# Patient Record
Sex: FEMALE | Race: WHITE | HISPANIC OR LATINO | URBAN - METROPOLITAN AREA
[De-identification: names, ages, dates, MRNs, and addresses within clinical notes are randomized per-mention and may not be internally consistent; named-entity substitution may affect disease eponyms.]

---

## 2018-12-12 ENCOUNTER — EMERGENCY (EMERGENCY)
Facility: HOSPITAL | Age: 28
LOS: 1 days | End: 2018-12-12

## 2018-12-12 VITALS
TEMPERATURE: 98 F | HEART RATE: 99 BPM | OXYGEN SATURATION: 100 % | DIASTOLIC BLOOD PRESSURE: 81 MMHG | RESPIRATION RATE: 20 BRPM | SYSTOLIC BLOOD PRESSURE: 111 MMHG
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
## 2021-08-30 DIAGNOSIS — Z01.818 ENCOUNTER FOR OTHER PREPROCEDURAL EXAMINATION: ICD-10-CM

## 2021-08-30 PROBLEM — Z00.00 ENCOUNTER FOR PREVENTIVE HEALTH EXAMINATION: Noted: 2021-08-30

## 2021-08-31 ENCOUNTER — APPOINTMENT (OUTPATIENT)
Dept: DISASTER EMERGENCY | Facility: CLINIC | Age: 31
End: 2021-08-31

## 2021-09-01 LAB — SARS-COV-2 N GENE NPH QL NAA+PROBE: NOT DETECTED

## 2021-09-03 ENCOUNTER — OUTPATIENT (OUTPATIENT)
Dept: OUTPATIENT SERVICES | Facility: HOSPITAL | Age: 31
LOS: 1 days | Discharge: HOME | End: 2021-09-03
Payer: MEDICAID

## 2021-09-03 ENCOUNTER — RESULT REVIEW (OUTPATIENT)
Age: 31
End: 2021-09-03

## 2021-09-03 VITALS
WEIGHT: 250 LBS | SYSTOLIC BLOOD PRESSURE: 123 MMHG | DIASTOLIC BLOOD PRESSURE: 64 MMHG | HEART RATE: 73 BPM | HEIGHT: 64 IN | TEMPERATURE: 98 F | RESPIRATION RATE: 17 BRPM | OXYGEN SATURATION: 100 %

## 2021-09-03 DIAGNOSIS — K21.9 GASTRO-ESOPHAGEAL REFLUX DISEASE WITHOUT ESOPHAGITIS: ICD-10-CM

## 2021-09-03 DIAGNOSIS — Z98.84 BARIATRIC SURGERY STATUS: Chronic | ICD-10-CM

## 2021-09-03 DIAGNOSIS — Z98.891 HISTORY OF UTERINE SCAR FROM PREVIOUS SURGERY: Chronic | ICD-10-CM

## 2021-09-03 DIAGNOSIS — E66.01 MORBID (SEVERE) OBESITY DUE TO EXCESS CALORIES: ICD-10-CM

## 2021-09-03 LAB — HCG UR QL: NEGATIVE — SIGNIFICANT CHANGE UP

## 2021-09-03 PROCEDURE — 81025 URINE PREGNANCY TEST: CPT

## 2021-09-03 PROCEDURE — 88312 SPECIAL STAINS GROUP 1: CPT

## 2021-09-03 PROCEDURE — 88305 TISSUE EXAM BY PATHOLOGIST: CPT

## 2021-09-03 PROCEDURE — 88312 SPECIAL STAINS GROUP 1: CPT | Mod: 26

## 2021-09-03 PROCEDURE — 88313 SPECIAL STAINS GROUP 2: CPT

## 2021-09-03 PROCEDURE — 88313 SPECIAL STAINS GROUP 2: CPT | Mod: 26

## 2021-09-03 PROCEDURE — 88305 TISSUE EXAM BY PATHOLOGIST: CPT | Mod: 26

## 2021-09-08 DIAGNOSIS — E66.01 MORBID (SEVERE) OBESITY DUE TO EXCESS CALORIES: ICD-10-CM

## 2021-09-08 DIAGNOSIS — K22.70 BARRETT'S ESOPHAGUS WITHOUT DYSPLASIA: ICD-10-CM

## 2021-09-08 DIAGNOSIS — Z98.84 BARIATRIC SURGERY STATUS: ICD-10-CM

## 2021-09-08 DIAGNOSIS — K29.50 UNSPECIFIED CHRONIC GASTRITIS WITHOUT BLEEDING: ICD-10-CM

## 2021-09-08 DIAGNOSIS — K21.9 GASTRO-ESOPHAGEAL REFLUX DISEASE WITHOUT ESOPHAGITIS: ICD-10-CM

## 2022-02-01 ENCOUNTER — OUTPATIENT (OUTPATIENT)
Dept: OUTPATIENT SERVICES | Facility: HOSPITAL | Age: 32
LOS: 1 days | End: 2022-02-01
Payer: MEDICAID

## 2022-02-01 VITALS
TEMPERATURE: 98 F | WEIGHT: 238.1 LBS | RESPIRATION RATE: 16 BRPM | SYSTOLIC BLOOD PRESSURE: 107 MMHG | HEIGHT: 64 IN | HEART RATE: 68 BPM | DIASTOLIC BLOOD PRESSURE: 82 MMHG | OXYGEN SATURATION: 100 %

## 2022-02-01 DIAGNOSIS — Z98.891 HISTORY OF UTERINE SCAR FROM PREVIOUS SURGERY: Chronic | ICD-10-CM

## 2022-02-01 DIAGNOSIS — Z29.9 ENCOUNTER FOR PROPHYLACTIC MEASURES, UNSPECIFIED: ICD-10-CM

## 2022-02-01 DIAGNOSIS — Z01.818 ENCOUNTER FOR OTHER PREPROCEDURAL EXAMINATION: ICD-10-CM

## 2022-02-01 DIAGNOSIS — Z98.890 OTHER SPECIFIED POSTPROCEDURAL STATES: Chronic | ICD-10-CM

## 2022-02-01 DIAGNOSIS — R13.10 DYSPHAGIA, UNSPECIFIED: ICD-10-CM

## 2022-02-01 DIAGNOSIS — Z98.84 BARIATRIC SURGERY STATUS: Chronic | ICD-10-CM

## 2022-02-01 LAB
ALBUMIN SERPL ELPH-MCNC: 3.3 G/DL — SIGNIFICANT CHANGE UP (ref 3.3–5)
ANION GAP SERPL CALC-SCNC: 5 MMOL/L — SIGNIFICANT CHANGE UP (ref 5–17)
APPEARANCE UR: CLEAR — SIGNIFICANT CHANGE UP
APTT BLD: 29.8 SEC — SIGNIFICANT CHANGE UP (ref 27.5–35.5)
BASOPHILS # BLD AUTO: 0.03 K/UL — SIGNIFICANT CHANGE UP (ref 0–0.2)
BASOPHILS NFR BLD AUTO: 0.5 % — SIGNIFICANT CHANGE UP (ref 0–2)
BILIRUB UR-MCNC: NEGATIVE — SIGNIFICANT CHANGE UP
BUN SERPL-MCNC: 9 MG/DL — SIGNIFICANT CHANGE UP (ref 7–23)
CALCIUM SERPL-MCNC: 9 MG/DL — SIGNIFICANT CHANGE UP (ref 8.5–10.1)
CHLORIDE SERPL-SCNC: 104 MMOL/L — SIGNIFICANT CHANGE UP (ref 96–108)
CO2 SERPL-SCNC: 26 MMOL/L — SIGNIFICANT CHANGE UP (ref 22–31)
COHGB MFR BLDV: 2.3 % — SIGNIFICANT CHANGE UP
COLOR SPEC: YELLOW — SIGNIFICANT CHANGE UP
CREAT SERPL-MCNC: 0.83 MG/DL — SIGNIFICANT CHANGE UP (ref 0.5–1.3)
DIFF PNL FLD: ABNORMAL
EOSINOPHIL # BLD AUTO: 0.16 K/UL — SIGNIFICANT CHANGE UP (ref 0–0.5)
EOSINOPHIL NFR BLD AUTO: 2.5 % — SIGNIFICANT CHANGE UP (ref 0–6)
GLUCOSE SERPL-MCNC: 96 MG/DL — SIGNIFICANT CHANGE UP (ref 70–99)
GLUCOSE UR QL: NEGATIVE — SIGNIFICANT CHANGE UP
HCT VFR BLD CALC: 37.2 % — SIGNIFICANT CHANGE UP (ref 34.5–45)
HGB BLD-MCNC: 11.8 G/DL — SIGNIFICANT CHANGE UP (ref 11.5–15.5)
IMM GRANULOCYTES NFR BLD AUTO: 0.5 % — SIGNIFICANT CHANGE UP (ref 0–1.5)
INR BLD: 1.02 RATIO — SIGNIFICANT CHANGE UP (ref 0.88–1.16)
KETONES UR-MCNC: NEGATIVE — SIGNIFICANT CHANGE UP
LEUKOCYTE ESTERASE UR-ACNC: NEGATIVE — SIGNIFICANT CHANGE UP
LYMPHOCYTES # BLD AUTO: 1.31 K/UL — SIGNIFICANT CHANGE UP (ref 1–3.3)
LYMPHOCYTES # BLD AUTO: 20.7 % — SIGNIFICANT CHANGE UP (ref 13–44)
MCHC RBC-ENTMCNC: 27.3 PG — SIGNIFICANT CHANGE UP (ref 27–34)
MCHC RBC-ENTMCNC: 31.7 GM/DL — LOW (ref 32–36)
MCV RBC AUTO: 85.9 FL — SIGNIFICANT CHANGE UP (ref 80–100)
MONOCYTES # BLD AUTO: 0.45 K/UL — SIGNIFICANT CHANGE UP (ref 0–0.9)
MONOCYTES NFR BLD AUTO: 7.1 % — SIGNIFICANT CHANGE UP (ref 2–14)
NEUTROPHILS # BLD AUTO: 4.36 K/UL — SIGNIFICANT CHANGE UP (ref 1.8–7.4)
NEUTROPHILS NFR BLD AUTO: 68.7 % — SIGNIFICANT CHANGE UP (ref 43–77)
NITRITE UR-MCNC: NEGATIVE — SIGNIFICANT CHANGE UP
PH UR: 6.5 — SIGNIFICANT CHANGE UP (ref 5–8)
PLATELET # BLD AUTO: 246 K/UL — SIGNIFICANT CHANGE UP (ref 150–400)
POTASSIUM SERPL-MCNC: 3.7 MMOL/L — SIGNIFICANT CHANGE UP (ref 3.5–5.3)
POTASSIUM SERPL-SCNC: 3.7 MMOL/L — SIGNIFICANT CHANGE UP (ref 3.5–5.3)
PROT UR-MCNC: SIGNIFICANT CHANGE UP
PROTHROM AB SERPL-ACNC: 11.8 SEC — SIGNIFICANT CHANGE UP (ref 10.6–13.6)
RBC # BLD: 4.33 M/UL — SIGNIFICANT CHANGE UP (ref 3.8–5.2)
RBC # FLD: 14.6 % — HIGH (ref 10.3–14.5)
SODIUM SERPL-SCNC: 135 MMOL/L — SIGNIFICANT CHANGE UP (ref 135–145)
SP GR SPEC: 1.01 — SIGNIFICANT CHANGE UP (ref 1.01–1.02)
UROBILINOGEN FLD QL: NEGATIVE — SIGNIFICANT CHANGE UP
WBC # BLD: 6.34 K/UL — SIGNIFICANT CHANGE UP (ref 3.8–10.5)
WBC # FLD AUTO: 6.34 K/UL — SIGNIFICANT CHANGE UP (ref 3.8–10.5)

## 2022-02-01 PROCEDURE — G0463: CPT | Mod: 25

## 2022-02-01 PROCEDURE — 93005 ELECTROCARDIOGRAM TRACING: CPT

## 2022-02-01 PROCEDURE — 81001 URINALYSIS AUTO W/SCOPE: CPT

## 2022-02-01 PROCEDURE — 36415 COLL VENOUS BLD VENIPUNCTURE: CPT

## 2022-02-01 PROCEDURE — 82375 ASSAY CARBOXYHB QUANT: CPT

## 2022-02-01 PROCEDURE — 93010 ELECTROCARDIOGRAM REPORT: CPT

## 2022-02-01 PROCEDURE — 85730 THROMBOPLASTIN TIME PARTIAL: CPT

## 2022-02-01 PROCEDURE — 86900 BLOOD TYPING SEROLOGIC ABO: CPT

## 2022-02-01 PROCEDURE — 85610 PROTHROMBIN TIME: CPT

## 2022-02-01 PROCEDURE — 80048 BASIC METABOLIC PNL TOTAL CA: CPT

## 2022-02-01 PROCEDURE — 82040 ASSAY OF SERUM ALBUMIN: CPT

## 2022-02-01 PROCEDURE — 86850 RBC ANTIBODY SCREEN: CPT

## 2022-02-01 PROCEDURE — 85025 COMPLETE CBC W/AUTO DIFF WBC: CPT

## 2022-02-01 PROCEDURE — 86901 BLOOD TYPING SEROLOGIC RH(D): CPT

## 2022-02-01 PROCEDURE — 71046 X-RAY EXAM CHEST 2 VIEWS: CPT

## 2022-02-01 PROCEDURE — 71046 X-RAY EXAM CHEST 2 VIEWS: CPT | Mod: 26

## 2022-02-01 NOTE — H&P PST ADULT - NSANTHOSAYNRD_GEN_A_CORE
No. CALI screening performed.  STOP BANG Legend: 0-2 = LOW Risk; 3-4 = INTERMEDIATE Risk; 5-8 = HIGH Risk

## 2022-02-01 NOTE — H&P PST ADULT - ASSESSMENT
31 y.o female scheduled for  31 y.o female scheduled for  Laparoscopic Gastric Bypass, Intraoperative Endoscopy   Plan  1. Stop all NSAIDS, herbal supplements and vitamins for 7 days.  2. NPO at midnight.  3. Take the following medications--none-- with small sips of water on the morning of your procedure/surgery.  4. Use EZ sponges as directed  5. Labs, EKG, CXR  as per surgeon  6. PMD YO [facility] & Dr Vega cardiology  visit for optimization prior to surgery as per surgeon  7. UCG STAT on admit  8. COVID swab appt: 2022    CAPRINI SCORE    AGE RELATED RISK FACTORS                                                       MOBILITY RELATED FACTORS  [ ] Age 41-60 years                                            (1 Point)                  [ ] Bed rest                                                        (1 Point)  [ ] Age: 61-74 years                                           (2 Points)                [ ] Plaster cast                                                   (2 Points)  [ ] Age= 75 years                                              (3 Points)                 [ ] Bed bound for more than 72 hours                   (2 Points)    DISEASE RELATED RISK FACTORS                                               GENDER SPECIFIC FACTORS  [ ] Edema in the lower extremities                       (1 Point)                  [ ] Pregnancy                                                     (1 Point)  [ ] Varicose veins                                               (1 Point)                  [ ] Post-partum < 6 weeks                                   (1 Point)             [x ] BMI > 25 Kg/m2                                            (1 Point)                  [ ] Hormonal therapy  or oral contraception            (1 Point)                 [ ] Sepsis (in the previous month)                        (1 Point)                  [ ] History of pregnancy complications  [ ] Pneumonia or serious lung disease                                               [ ] Unexplained or recurrent                       (1 Point)           (in the previous month)                               (1 Point)  [ ] Abnormal pulmonary function test                     (1 Point)                 SURGERY RELATED RISK FACTORS  [ ] Acute myocardial infarction                              (1 Point)                 [ ]  Section                                            (1 Point)  [ ] Congestive heart failure (in the previous month)  (1 Point)                 [ ] Minor surgery                                                 (1 Point)   [ ] Inflammatory bowel disease                             (1 Point)                 [ ] Arthroscopic surgery                                        (2 Points)  [ ] Central venous access                                    (2 Points)                [x ] General surgery lasting more than 45 minutes   (2 Points)       [ ] Stroke (in the previous month)                          (5 Points)               [ ] Elective arthroplasty                                        (5 Points)                                                                                                                                               HEMATOLOGY RELATED FACTORS                                                 TRAUMA RELATED RISK FACTORS  [ ] Prior episodes of VTE                                     (3 Points)                 [ ] Fracture of the hip, pelvis, or leg                       (5 Points)  [ ] Positive family history for VTE                         (3 Points)                 [ ] Acute spinal cord injury (in the previous month)  (5 Points)  [ ] Prothrombin 17435 A                                      (3 Points)                 [ ] Paralysis  (less than 1 month)                          (5 Points)  [ ] Factor V Leiden                                             (3 Points)                 [ ] Multiple Trauma within 1 month                         (5 Points)  [ ] Lupus anticoagulants                                     (3 Points)                                                           [ ] Anticardiolipin antibodies                                (3 Points)                                                       [ ] High homocysteine in the blood                      (3 Points)                                             [ ] Other congenital or acquired thrombophilia       (3 Points)                                                [ ] Heparin induced thrombocytopenia                  (3 Points)                                          Total Score [    3      ]    The Caprini score indicates this patient is at risk for a VTE event (score 3-5).  Most surgical patients in this group would benefit from pharmacologic prophylaxis.  The surgical team will determine the balance between VTE risk and bleeding risk

## 2022-02-01 NOTE — H&P PST ADULT - NSICDXPASTMEDICALHX_GEN_ALL_CORE_FT
PAST MEDICAL HISTORY:  Dysphagia     GERD (gastroesophageal reflux disease)     Obesity, morbid, BMI 40.0-49.9

## 2022-02-01 NOTE — H&P PST ADULT - HEIGHT IN INCHES
The patient is awake, alert and cooperative with a calm affect, patient is aware of environment, family at bedside. Airway is open and patent, respirations are spontaneous, normal respiratory effort and rate noted, skin warm and dry, full ROM in all extremities, appearance: no apparent distress noted, resting comfortably.  VSS, no change from previous assessment.  Bed in low, locked position, HOB 30 degrees.  Pt able to change position independently.  Call bell within reach of pt.  Pt verbalizes understanding of use.  Will continue to monitor.  
4

## 2022-02-01 NOTE — H&P PST ADULT - NSICDXPASTSURGICALHX_GEN_ALL_CORE_FT
PAST SURGICAL HISTORY:  H/O bariatric surgery Gastric Sleeve  2013    H/O  section TWINS  2015    History of esophagogastroduodenoscopy (EGD) 2021

## 2022-02-01 NOTE — H&P PST ADULT - HISTORY OF PRESENT ILLNESS
31 y.o WD, WN morbidly obese female presents to PST with hx of severe acid reflux. Patient reports hx of having a gastric sleeve in  and lost 220 lbs. She did well until she had twins via  in . Patient states she gained back about a 100 lbs after that. She began to have severe acid reflux and difficulty tolerating meals. Despite medication her symptoms continued and worsened. Patient followed with bariatric surgeon and opting for surgical intervention. Scheduled for  31 y.o WD, WN morbidly obese female presents to PST with hx of severe acid reflux. Patient reports hx of having a gastric sleeve in  and lost 220 lbs. She did well until she had twins via  in . Patient states she gained back about a 100 lbs after that. She began to have severe acid reflux and difficulty tolerating meals. Despite medication her symptoms continued and worsened. Patient followed with bariatric surgeon and opting for surgical intervention. Scheduled for Laparoscopic Gastric Bypass, Intraoperative Endoscopy

## 2022-02-02 DIAGNOSIS — R13.10 DYSPHAGIA, UNSPECIFIED: ICD-10-CM

## 2022-02-02 DIAGNOSIS — Z01.818 ENCOUNTER FOR OTHER PREPROCEDURAL EXAMINATION: ICD-10-CM

## 2022-02-06 ENCOUNTER — TRANSCRIPTION ENCOUNTER (OUTPATIENT)
Age: 32
End: 2022-02-06

## 2022-02-08 RX ORDER — SODIUM CHLORIDE 9 MG/ML
3 INJECTION INTRAMUSCULAR; INTRAVENOUS; SUBCUTANEOUS EVERY 8 HOURS
Refills: 0 | Status: DISCONTINUED | OUTPATIENT
Start: 2022-02-09 | End: 2022-02-10

## 2022-02-09 ENCOUNTER — INPATIENT (INPATIENT)
Facility: HOSPITAL | Age: 32
LOS: 0 days | Discharge: HOME | DRG: 403 | End: 2022-02-10
Attending: SURGERY | Admitting: SURGERY
Payer: MEDICAID

## 2022-02-09 VITALS
SYSTOLIC BLOOD PRESSURE: 123 MMHG | TEMPERATURE: 97 F | HEIGHT: 64 IN | OXYGEN SATURATION: 100 % | RESPIRATION RATE: 16 BRPM | WEIGHT: 238.1 LBS | HEART RATE: 76 BPM | DIASTOLIC BLOOD PRESSURE: 76 MMHG

## 2022-02-09 DIAGNOSIS — R13.10 DYSPHAGIA, UNSPECIFIED: ICD-10-CM

## 2022-02-09 DIAGNOSIS — Z98.890 OTHER SPECIFIED POSTPROCEDURAL STATES: Chronic | ICD-10-CM

## 2022-02-09 DIAGNOSIS — K21.9 GASTRO-ESOPHAGEAL REFLUX DISEASE WITHOUT ESOPHAGITIS: ICD-10-CM

## 2022-02-09 DIAGNOSIS — Z98.84 BARIATRIC SURGERY STATUS: Chronic | ICD-10-CM

## 2022-02-09 DIAGNOSIS — Z98.891 HISTORY OF UTERINE SCAR FROM PREVIOUS SURGERY: Chronic | ICD-10-CM

## 2022-02-09 LAB — HCG UR QL: NEGATIVE — SIGNIFICANT CHANGE UP

## 2022-02-09 PROCEDURE — C9113: CPT

## 2022-02-09 PROCEDURE — 36415 COLL VENOUS BLD VENIPUNCTURE: CPT

## 2022-02-09 PROCEDURE — C1889: CPT

## 2022-02-09 PROCEDURE — 85025 COMPLETE CBC W/AUTO DIFF WBC: CPT

## 2022-02-09 PROCEDURE — 80048 BASIC METABOLIC PNL TOTAL CA: CPT

## 2022-02-09 PROCEDURE — 81025 URINE PREGNANCY TEST: CPT

## 2022-02-09 PROCEDURE — 82962 GLUCOSE BLOOD TEST: CPT

## 2022-02-09 RX ORDER — ONDANSETRON 8 MG/1
4 TABLET, FILM COATED ORAL ONCE
Refills: 0 | Status: DISCONTINUED | OUTPATIENT
Start: 2022-02-09 | End: 2022-02-09

## 2022-02-09 RX ORDER — APREPITANT 80 MG/1
80 CAPSULE ORAL ONCE
Refills: 0 | Status: COMPLETED | OUTPATIENT
Start: 2022-02-09 | End: 2022-02-09

## 2022-02-09 RX ORDER — ONDANSETRON 8 MG/1
4 TABLET, FILM COATED ORAL EVERY 6 HOURS
Refills: 0 | Status: DISCONTINUED | OUTPATIENT
Start: 2022-02-09 | End: 2022-02-10

## 2022-02-09 RX ORDER — OXYCODONE HYDROCHLORIDE 5 MG/1
10 TABLET ORAL EVERY 4 HOURS
Refills: 0 | Status: DISCONTINUED | OUTPATIENT
Start: 2022-02-09 | End: 2022-02-10

## 2022-02-09 RX ORDER — ENOXAPARIN SODIUM 100 MG/ML
40 INJECTION SUBCUTANEOUS EVERY 12 HOURS
Refills: 0 | Status: DISCONTINUED | OUTPATIENT
Start: 2022-02-09 | End: 2022-02-10

## 2022-02-09 RX ORDER — SODIUM CHLORIDE 9 MG/ML
1000 INJECTION, SOLUTION INTRAVENOUS
Refills: 0 | Status: DISCONTINUED | OUTPATIENT
Start: 2022-02-09 | End: 2022-02-09

## 2022-02-09 RX ORDER — ACETAMINOPHEN 500 MG
1000 TABLET ORAL ONCE
Refills: 0 | Status: COMPLETED | OUTPATIENT
Start: 2022-02-09 | End: 2022-02-09

## 2022-02-09 RX ORDER — HEPARIN SODIUM 5000 [USP'U]/ML
5000 INJECTION INTRAVENOUS; SUBCUTANEOUS ONCE
Refills: 0 | Status: COMPLETED | OUTPATIENT
Start: 2022-02-09 | End: 2022-02-09

## 2022-02-09 RX ORDER — FAMOTIDINE 10 MG/ML
20 INJECTION INTRAVENOUS ONCE
Refills: 0 | Status: COMPLETED | OUTPATIENT
Start: 2022-02-09 | End: 2022-02-09

## 2022-02-09 RX ORDER — MEPERIDINE HYDROCHLORIDE 50 MG/ML
12.5 INJECTION INTRAMUSCULAR; INTRAVENOUS; SUBCUTANEOUS
Refills: 0 | Status: DISCONTINUED | OUTPATIENT
Start: 2022-02-09 | End: 2022-02-09

## 2022-02-09 RX ORDER — OXYCODONE HYDROCHLORIDE 5 MG/1
5 TABLET ORAL EVERY 4 HOURS
Refills: 0 | Status: DISCONTINUED | OUTPATIENT
Start: 2022-02-09 | End: 2022-02-10

## 2022-02-09 RX ORDER — HYDROMORPHONE HYDROCHLORIDE 2 MG/ML
0.5 INJECTION INTRAMUSCULAR; INTRAVENOUS; SUBCUTANEOUS
Refills: 0 | Status: DISCONTINUED | OUTPATIENT
Start: 2022-02-09 | End: 2022-02-09

## 2022-02-09 RX ORDER — FENTANYL CITRATE 50 UG/ML
25 INJECTION INTRAVENOUS
Refills: 0 | Status: DISCONTINUED | OUTPATIENT
Start: 2022-02-09 | End: 2022-02-09

## 2022-02-09 RX ORDER — SODIUM CHLORIDE 9 MG/ML
1000 INJECTION, SOLUTION INTRAVENOUS
Refills: 0 | Status: DISCONTINUED | OUTPATIENT
Start: 2022-02-09 | End: 2022-02-10

## 2022-02-09 RX ORDER — PROCHLORPERAZINE MALEATE 5 MG
10 TABLET ORAL ONCE
Refills: 0 | Status: COMPLETED | OUTPATIENT
Start: 2022-02-09 | End: 2022-02-09

## 2022-02-09 RX ORDER — PANTOPRAZOLE SODIUM 20 MG/1
40 TABLET, DELAYED RELEASE ORAL EVERY 24 HOURS
Refills: 0 | Status: DISCONTINUED | OUTPATIENT
Start: 2022-02-09 | End: 2022-02-10

## 2022-02-09 RX ORDER — KETOROLAC TROMETHAMINE 30 MG/ML
30 SYRINGE (ML) INJECTION EVERY 6 HOURS
Refills: 0 | Status: DISCONTINUED | OUTPATIENT
Start: 2022-02-09 | End: 2022-02-10

## 2022-02-09 RX ORDER — ACETAMINOPHEN 500 MG
1000 TABLET ORAL ONCE
Refills: 0 | Status: DISCONTINUED | OUTPATIENT
Start: 2022-02-09 | End: 2022-02-10

## 2022-02-09 RX ADMIN — Medication 400 MILLIGRAM(S): at 18:08

## 2022-02-09 RX ADMIN — ENOXAPARIN SODIUM 40 MILLIGRAM(S): 100 INJECTION SUBCUTANEOUS at 22:01

## 2022-02-09 RX ADMIN — OXYCODONE HYDROCHLORIDE 10 MILLIGRAM(S): 5 TABLET ORAL at 20:11

## 2022-02-09 RX ADMIN — Medication 30 MILLIGRAM(S): at 18:08

## 2022-02-09 RX ADMIN — Medication 400 MILLIGRAM(S): at 23:11

## 2022-02-09 RX ADMIN — HYDROMORPHONE HYDROCHLORIDE 0.5 MILLIGRAM(S): 2 INJECTION INTRAMUSCULAR; INTRAVENOUS; SUBCUTANEOUS at 17:18

## 2022-02-09 RX ADMIN — Medication 30 MILLIGRAM(S): at 18:21

## 2022-02-09 RX ADMIN — HYDROMORPHONE HYDROCHLORIDE 0.5 MILLIGRAM(S): 2 INJECTION INTRAMUSCULAR; INTRAVENOUS; SUBCUTANEOUS at 16:00

## 2022-02-09 RX ADMIN — HYDROMORPHONE HYDROCHLORIDE 0.5 MILLIGRAM(S): 2 INJECTION INTRAMUSCULAR; INTRAVENOUS; SUBCUTANEOUS at 17:25

## 2022-02-09 RX ADMIN — HEPARIN SODIUM 5000 UNIT(S): 5000 INJECTION INTRAVENOUS; SUBCUTANEOUS at 10:47

## 2022-02-09 RX ADMIN — SODIUM CHLORIDE 150 MILLILITER(S): 9 INJECTION, SOLUTION INTRAVENOUS at 15:30

## 2022-02-09 RX ADMIN — APREPITANT 80 MILLIGRAM(S): 80 CAPSULE ORAL at 10:47

## 2022-02-09 RX ADMIN — HYDROMORPHONE HYDROCHLORIDE 0.5 MILLIGRAM(S): 2 INJECTION INTRAMUSCULAR; INTRAVENOUS; SUBCUTANEOUS at 15:45

## 2022-02-09 RX ADMIN — ONDANSETRON 4 MILLIGRAM(S): 8 TABLET, FILM COATED ORAL at 19:20

## 2022-02-09 RX ADMIN — SODIUM CHLORIDE 3 MILLILITER(S): 9 INJECTION INTRAMUSCULAR; INTRAVENOUS; SUBCUTANEOUS at 21:47

## 2022-02-09 RX ADMIN — Medication 10 MILLIGRAM(S): at 20:26

## 2022-02-09 RX ADMIN — Medication 30 MILLIGRAM(S): at 23:11

## 2022-02-09 RX ADMIN — HYDROMORPHONE HYDROCHLORIDE 0.5 MILLIGRAM(S): 2 INJECTION INTRAMUSCULAR; INTRAVENOUS; SUBCUTANEOUS at 15:29

## 2022-02-09 RX ADMIN — Medication 1000 MILLIGRAM(S): at 18:21

## 2022-02-09 RX ADMIN — FAMOTIDINE 20 MILLIGRAM(S): 10 INJECTION INTRAVENOUS at 10:48

## 2022-02-09 RX ADMIN — PANTOPRAZOLE SODIUM 40 MILLIGRAM(S): 20 TABLET, DELAYED RELEASE ORAL at 15:30

## 2022-02-09 RX ADMIN — OXYCODONE HYDROCHLORIDE 10 MILLIGRAM(S): 5 TABLET ORAL at 21:47

## 2022-02-09 NOTE — BRIEF OPERATIVE NOTE - OPERATION/FINDINGS
Patient underwent laparoscopic sleeve gastrectomy to RNY gastric bypass (Stalin 150cm & BP  150cm) & primary repair of hiatal hernia without any complications. Intraoperative EGD confirmed intact GJ anastomosis that is easily transversed with negative leak test.

## 2022-02-09 NOTE — BRIEF OPERATIVE NOTE - NSICDXBRIEFPOSTOP_GEN_ALL_CORE_FT
POST-OP DIAGNOSIS:  Morbid obesity 09-Feb-2022 13:08:06  Michael Yancey  Hiatal hernia 09-Feb-2022 13:08:11  Michael Yancey

## 2022-02-09 NOTE — PATIENT PROFILE ADULT - FALL HARM RISK - UNIVERSAL INTERVENTIONS
Bed in lowest position, wheels locked, appropriate side rails in place/Call bell, personal items and telephone in reach/Instruct patient to call for assistance before getting out of bed or chair/Non-slip footwear when patient is out of bed/[place] to call system/Physically safe environment - no spills, clutter or unnecessary equipment/Purposeful Proactive Rounding/Room/bathroom lighting operational, light cord in reach

## 2022-02-10 ENCOUNTER — TRANSCRIPTION ENCOUNTER (OUTPATIENT)
Age: 32
End: 2022-02-10

## 2022-02-10 VITALS
OXYGEN SATURATION: 98 % | HEART RATE: 72 BPM | DIASTOLIC BLOOD PRESSURE: 66 MMHG | RESPIRATION RATE: 16 BRPM | TEMPERATURE: 100 F | SYSTOLIC BLOOD PRESSURE: 119 MMHG

## 2022-02-10 DIAGNOSIS — K21.9 GASTRO-ESOPHAGEAL REFLUX DISEASE WITHOUT ESOPHAGITIS: ICD-10-CM

## 2022-02-10 LAB
ANION GAP SERPL CALC-SCNC: 6 MMOL/L — SIGNIFICANT CHANGE UP (ref 5–17)
BASOPHILS # BLD AUTO: 0.01 K/UL — SIGNIFICANT CHANGE UP (ref 0–0.2)
BASOPHILS NFR BLD AUTO: 0.1 % — SIGNIFICANT CHANGE UP (ref 0–2)
BUN SERPL-MCNC: 7 MG/DL — SIGNIFICANT CHANGE UP (ref 7–23)
CALCIUM SERPL-MCNC: 8.8 MG/DL — SIGNIFICANT CHANGE UP (ref 8.5–10.1)
CHLORIDE SERPL-SCNC: 108 MMOL/L — SIGNIFICANT CHANGE UP (ref 96–108)
CO2 SERPL-SCNC: 26 MMOL/L — SIGNIFICANT CHANGE UP (ref 22–31)
CREAT SERPL-MCNC: 0.62 MG/DL — SIGNIFICANT CHANGE UP (ref 0.5–1.3)
EOSINOPHIL # BLD AUTO: 0 K/UL — SIGNIFICANT CHANGE UP (ref 0–0.5)
EOSINOPHIL NFR BLD AUTO: 0 % — SIGNIFICANT CHANGE UP (ref 0–6)
GLUCOSE SERPL-MCNC: 99 MG/DL — SIGNIFICANT CHANGE UP (ref 70–99)
HCT VFR BLD CALC: 32.1 % — LOW (ref 34.5–45)
HGB BLD-MCNC: 10.3 G/DL — LOW (ref 11.5–15.5)
IMM GRANULOCYTES NFR BLD AUTO: 0.4 % — SIGNIFICANT CHANGE UP (ref 0–1.5)
LYMPHOCYTES # BLD AUTO: 0.67 K/UL — LOW (ref 1–3.3)
LYMPHOCYTES # BLD AUTO: 5.2 % — LOW (ref 13–44)
MCHC RBC-ENTMCNC: 28.5 PG — SIGNIFICANT CHANGE UP (ref 27–34)
MCHC RBC-ENTMCNC: 32.1 GM/DL — SIGNIFICANT CHANGE UP (ref 32–36)
MCV RBC AUTO: 88.7 FL — SIGNIFICANT CHANGE UP (ref 80–100)
MONOCYTES # BLD AUTO: 0.76 K/UL — SIGNIFICANT CHANGE UP (ref 0–0.9)
MONOCYTES NFR BLD AUTO: 6 % — SIGNIFICANT CHANGE UP (ref 2–14)
NEUTROPHILS # BLD AUTO: 11.28 K/UL — HIGH (ref 1.8–7.4)
NEUTROPHILS NFR BLD AUTO: 88.3 % — HIGH (ref 43–77)
PLATELET # BLD AUTO: 233 K/UL — SIGNIFICANT CHANGE UP (ref 150–400)
POTASSIUM SERPL-MCNC: 4.4 MMOL/L — SIGNIFICANT CHANGE UP (ref 3.5–5.3)
POTASSIUM SERPL-SCNC: 4.4 MMOL/L — SIGNIFICANT CHANGE UP (ref 3.5–5.3)
RBC # BLD: 3.62 M/UL — LOW (ref 3.8–5.2)
RBC # FLD: 14.4 % — SIGNIFICANT CHANGE UP (ref 10.3–14.5)
SODIUM SERPL-SCNC: 140 MMOL/L — SIGNIFICANT CHANGE UP (ref 135–145)
WBC # BLD: 12.77 K/UL — HIGH (ref 3.8–10.5)
WBC # FLD AUTO: 12.77 K/UL — HIGH (ref 3.8–10.5)

## 2022-02-10 PROCEDURE — 99221 1ST HOSP IP/OBS SF/LOW 40: CPT

## 2022-02-10 RX ORDER — ACETAMINOPHEN 500 MG
1000 TABLET ORAL ONCE
Refills: 0 | Status: COMPLETED | OUTPATIENT
Start: 2022-02-10 | End: 2022-02-10

## 2022-02-10 RX ADMIN — OXYCODONE HYDROCHLORIDE 10 MILLIGRAM(S): 5 TABLET ORAL at 06:23

## 2022-02-10 RX ADMIN — Medication 30 MILLIGRAM(S): at 06:51

## 2022-02-10 RX ADMIN — Medication 30 MILLIGRAM(S): at 05:59

## 2022-02-10 RX ADMIN — Medication 1000 MILLIGRAM(S): at 06:51

## 2022-02-10 RX ADMIN — PANTOPRAZOLE SODIUM 40 MILLIGRAM(S): 20 TABLET, DELAYED RELEASE ORAL at 10:29

## 2022-02-10 RX ADMIN — Medication 1000 MILLIGRAM(S): at 13:43

## 2022-02-10 RX ADMIN — OXYCODONE HYDROCHLORIDE 10 MILLIGRAM(S): 5 TABLET ORAL at 11:30

## 2022-02-10 RX ADMIN — ENOXAPARIN SODIUM 40 MILLIGRAM(S): 100 INJECTION SUBCUTANEOUS at 10:29

## 2022-02-10 RX ADMIN — SODIUM CHLORIDE 3 MILLILITER(S): 9 INJECTION INTRAMUSCULAR; INTRAVENOUS; SUBCUTANEOUS at 06:51

## 2022-02-10 RX ADMIN — Medication 1000 MILLIGRAM(S): at 00:14

## 2022-02-10 RX ADMIN — Medication 400 MILLIGRAM(S): at 05:59

## 2022-02-10 RX ADMIN — Medication 400 MILLIGRAM(S): at 11:55

## 2022-02-10 RX ADMIN — Medication 30 MILLIGRAM(S): at 00:14

## 2022-02-10 RX ADMIN — OXYCODONE HYDROCHLORIDE 10 MILLIGRAM(S): 5 TABLET ORAL at 06:56

## 2022-02-10 RX ADMIN — Medication 30 MILLIGRAM(S): at 11:55

## 2022-02-10 RX ADMIN — Medication 30 MILLIGRAM(S): at 13:43

## 2022-02-10 RX ADMIN — OXYCODONE HYDROCHLORIDE 10 MILLIGRAM(S): 5 TABLET ORAL at 10:30

## 2022-02-10 NOTE — DISCHARGE NOTE NURSING/CASE MANAGEMENT/SOCIAL WORK - NSDCPEFALRISK_GEN_ALL_CORE
For information on Fall & Injury Prevention, visit: [email or web address] OR  [email or web address] OR  https://www.cdc.gov/steadi/patient.html

## 2022-02-10 NOTE — PROGRESS NOTE ADULT - SUBJECTIVE AND OBJECTIVE BOX
Post Op Day#: 1  Procedure:  Laparoscopic Conversion of Sleeve Gastrectomy to Gastric RNY Bypass    The patient is doing well without complaints.    Vital Signs Last 24 Hrs  T(C): 37.6 (10 Feb 2022 09:17), Max: 37.6 (10 Feb 2022 09:17)  T(F): 99.6 (10 Feb 2022 09:17), Max: 99.6 (10 Feb 2022 09:17)  HR: 72 (10 Feb 2022 09:17) (63 - 100)  BP: 119/66 (10 Feb 2022 09:17) (103/50 - 134/74)  BP(mean): 78 (10 Feb 2022 09:17) (78 - 78)  RR: 16 (10 Feb 2022 09:17) (14 - 18)  SpO2: 98% (10 Feb 2022 09:17) (97% - 100%)    PHYSICAL EXAM:  General: NAD.  HEENT: no JVD, no jaundice.  LUNGS: CTAB.  Heart: S1 S2 RRR  Abd: soft nt/nd   Wounds: clean dry and intact                          10.3   12.77 )-----------( 233      ( 10 Feb 2022 07:55 )             32.1       02-10    140  |  108  |  7   ----------------------------<  99  4.4   |  26  |  0.62    Ca    8.8      10 Feb 2022 07:55

## 2022-02-10 NOTE — DISCHARGE NOTE NURSING/CASE MANAGEMENT/SOCIAL WORK - PATIENT PORTAL LINK FT
You can access the FollowMyHealth Patient Portal offered by [facility] by registering at the following website: [email or web address]. By joining [facility]’s FollowMyHealth portal, you will also be able to view your health information using other applications (apps) compatible with our system.

## 2022-02-10 NOTE — CONSULT NOTE ADULT - SUBJECTIVE AND OBJECTIVE BOX
PCP    Patient is a 31y old  Female who presents with a chief complaint of       HPI:      Review of system- All 10 systems reviewed and is as per HPI otherwise negative.           T(C): 36.7 (02-10-22 @ 05:24), Max: 37.1 (02-09-22 @ 18:32)  HR: 63 (02-10-22 @ 05:24) (63 - 100)  BP: 115/57 (02-10-22 @ 05:24) (103/50 - 134/74)  RR: 17 (02-10-22 @ 05:24) (14 - 18)  SpO2: 100% (02-10-22 @ 05:24) (97% - 100%)  Wt(kg): --      LABS:    02-10    140  |  108  |  7   ----------------------------<  99  4.4   |  26  |  0.62    Ca    8.8      10 Feb 2022 07:55            CAPILLARY BLOOD GLUCOSE      POCT Blood Glucose.: 112 mg/dL (10 Feb 2022 06:06)  POCT Blood Glucose.: 130 mg/dL (09 Feb 2022 21:59)  POCT Blood Glucose.: 133 mg/dL (09 Feb 2022 15:32)  POCT Blood Glucose.: 94 mg/dL (09 Feb 2022 10:12)      CAPILLARY BLOOD GLUCOSE      POCT Blood Glucose.: 112 mg/dL (10 Feb 2022 06:06)  POCT Blood Glucose.: 130 mg/dL (09 Feb 2022 21:59)  POCT Blood Glucose.: 133 mg/dL (09 Feb 2022 15:32)  POCT Blood Glucose.: 94 mg/dL (09 Feb 2022 10:12)    CAPILLARY BLOOD GLUCOSE      POCT Blood Glucose.: 112 mg/dL (10 Feb 2022 06:06)            RECENT CULTURES:      RADIOLOGY & ADDITIONAL TESTS:      PHYSICAL EXAM:    GENERAL: NAD, well-groomed, well-developed  HEAD:  Atraumatic, Normocephalic  EYES: EOMI, PERRLA, conjunctiva and sclera clear  HEENT: Moist mucous membranes  NECK: Supple, No JVD  NERVOUS SYSTEM:  Alert & Oriented X3, Motor Strength 5/5 B/L upper and lower extremities; DTRs 2+ intact and symmetric  CHEST/LUNG: Clear to auscultation bilaterally; No rales, rhonchi, wheezing, or rubs  HEART: Regular rate and rhythm; No murmurs, rubs, or gallops  ABDOMEN: Soft, Nontender, Nondistended; Bowel sounds present  GENITOURINARY- Voiding, no palpable bladder  EXTREMITIES:  2+ Peripheral Pulses, No clubbing, cyanosis, or edema  MUSCULOSKELTAL- No muscle tenderness, Muscle tone normal, No joint tenderness, no Joint swelling, Joint range of motion-normal  SKIN-no rash, no lesion  CNS- alert, oriented X3, non focal       Daily     Daily       acetaminophen   IVPB .. 1000 milliGRAM(s) IV Intermittent once  acetaminophen   IVPB .. 1000 milliGRAM(s) IV Intermittent once  enoxaparin Injectable 40 milliGRAM(s) SubCutaneous every 12 hours  ketorolac   Injectable 30 milliGRAM(s) IV Push every 6 hours  lactated ringers. 1000 milliLiter(s) IV Continuous <Continuous>  LORazepam   Injectable 0.5 milliGRAM(s) IV Push every 6 hours PRN  ondansetron Injectable 4 milliGRAM(s) IV Push every 6 hours PRN  oxyCODONE    Solution 5 milliGRAM(s) Oral every 4 hours PRN  oxyCODONE    Solution 10 milliGRAM(s) Oral every 4 hours PRN  pantoprazole  Injectable 40 milliGRAM(s) IV Push every 24 hours  sodium chloride 0.9% lock flush 3 milliLiter(s) IV Push every 8 hours        Assessment    Plan       CC- morbid obesity, intractable GERD    HPI:  30yo/F with PMH morbid obesity, s/p previous gastric sleeve in  with successful loss of weight. She however developed intractable GERD symptoms and scheduled for conversion to gastric bypass with hiatal hernia repair. Medical consult called for postop medical management    PMH- as above  [id number]  Soc hx- denies smoking, alcohol socially  Fam hx- both parents alive and well    2/10/22- POD#1, doing well. Tolerating gagan clears. Ambulating. Minimal pain. Wants to go home    Review of system- All 10 systems reviewed and is as per HPI otherwise negative.     T(C): 36.7 (02-10-22 @ 05:24), Max: 37.1 (22 @ 18:32)  HR: 63 (02-10-22 @ 05:24) (63 - 100)  BP: 115/57 (02-10-22 @ 05:24) (103/50 - 134/74)  RR: 17 (02-10-22 @ 05:24) (14 - 18)  SpO2: 100% (02-10-22 @ 05:24) (97% - 100%)  Wt(kg): --      LABS:                        10.3   12.77 )-----------( 233      ( 10 Feb 2022 07:55 )             32.1     10 Feb 2022 07:55    140    |  108    |  7      ----------------------------<  99     4.4     |  26     |  0.62     Ca    8.8        10 Feb 2022 07:55    CAPILLARY BLOOD GLUCOSE  POCT Blood Glucose.: 112 mg/dL (10 Feb 2022 06:06)  POCT Blood Glucose.: 130 mg/dL (2022 21:59)  POCT Blood Glucose.: 133 mg/dL (2022 15:32)    RADIOLOGY & ADDITIONAL TESTS:      PHYSICAL EXAM:  GENERAL: NAD, well-groomed, well-developed  HEAD:  Atraumatic, Normocephalic  EYES: EOMI, PERRLA, conjunctiva and sclera clear  HEENT: Moist mucous membranes  NECK: Supple, No JVD  NERVOUS SYSTEM:  Alert & Oriented X3, Motor Strength 5/5 B/L upper and lower extremities; DTRs 2+ intact and symmetric  CHEST/LUNG: Clear to auscultation bilaterally; No rales, rhonchi, wheezing, or rubs  HEART: Regular rate and rhythm; No murmurs, rubs, or gallops  ABDOMEN: Soft, Nontender, Nondistended; Bowel sounds present, lap sites are clean  GENITOURINARY- Voiding, no palpable bladder  EXTREMITIES:  2+ Peripheral Pulses, No clubbing, cyanosis, or edema  MUSCULOSKELTAL- No muscle tenderness, Muscle tone normal, No joint tenderness, no Joint swelling, Joint range of motion-normal  SKIN-no rash, no lesion  CNS- alert, oriented X3, non focal     MEDICATIONS  (STANDING):  acetaminophen   IVPB .. 1000 milliGRAM(s) IV Intermittent once  acetaminophen   IVPB .. 1000 milliGRAM(s) IV Intermittent once  enoxaparin Injectable 40 milliGRAM(s) SubCutaneous every 12 hours  ketorolac   Injectable 30 milliGRAM(s) IV Push every 6 hours  lactated ringers. 1000 milliLiter(s) (150 mL/Hr) IV Continuous <Continuous>  pantoprazole  Injectable 40 milliGRAM(s) IV Push every 24 hours  sodium chloride 0.9% lock flush 3 milliLiter(s) IV Push every 8 hours    MEDICATIONS  (PRN):  LORazepam   Injectable 0.5 milliGRAM(s) IV Push every 6 hours PRN Anxiety  ondansetron Injectable 4 milliGRAM(s) IV Push every 6 hours PRN Nausea and/or Vomiting  oxyCODONE    Solution 5 milliGRAM(s) Oral every 4 hours PRN Mild Pain (1 - 3)  oxyCODONE    Solution 10 milliGRAM(s) Oral every 4 hours PRN Moderate Pain (4 - 6)    Assessment/Plan  #Morbid obesity s/p conversion of gastric sleeve to gastric bypass  #GERD s/p hiatal hernia repair  Surg f/u appreciated  Tolerating gagan clears  Ambulating  Pain meds prn  PPI    #DVT proph- Lovenox    #Dispo- thank you for consult, likely home alter on today

## 2022-02-10 NOTE — DISCHARGE NOTE PROVIDER - HOSPITAL COURSE
Patient is a 30 yo F that underwent laparoscopic gastric RNY bypass without any complications. Patient is currently doing very well, pain controlled, and is tolerating phase I bariatric diet. Patient has had uncomplicated hospital course and is stable for discharge home with follow-up in the office in 2 weeks.

## 2022-02-10 NOTE — PROGRESS NOTE ADULT - PROBLEM SELECTOR PLAN 1
The patient is s/p lap gastric bypass and doing very well.  All discharge instructions were given to the patient, as well as potential signs of complications.  The patient will follow up in 2 weeks.  [facility]

## 2022-02-10 NOTE — DISCHARGE NOTE PROVIDER - NSDCMRMEDTOKEN_GEN_ALL_CORE_FT
Bariatric Multivitamn: 1   once a day  omeprazole 40 mg oral delayed release capsule: 1 cap(s) orally once a day

## 2022-02-10 NOTE — DISCHARGE NOTE PROVIDER - CARE PROVIDER_API CALL
Lucian Ferguson)  Surgery  [address], [address]  [place] [address]  Phone: [phone]  Fax: [phone]  Follow Up Time: 2 weeks

## 2022-02-10 NOTE — DISCHARGE NOTE PROVIDER - NSDCCPCAREPLAN_GEN_ALL_CORE_FT
PRINCIPAL DISCHARGE DIAGNOSIS  Diagnosis: GERD (gastroesophageal reflux disease)  Assessment and Plan of Treatment:

## 2022-02-12 DIAGNOSIS — K44.9 DIAPHRAGMATIC HERNIA WITHOUT OBSTRUCTION OR GANGRENE: ICD-10-CM

## 2022-02-12 DIAGNOSIS — E66.01 MORBID (SEVERE) OBESITY DUE TO EXCESS CALORIES: ICD-10-CM

## 2022-02-12 DIAGNOSIS — K21.9 GASTRO-ESOPHAGEAL REFLUX DISEASE WITHOUT ESOPHAGITIS: ICD-10-CM

## 2022-02-21 NOTE — CHART NOTE - NSCHARTNOTEFT_GEN_A_CORE
Operative Note    Patient: Marci Maxwell)  : 1990  Surgery date: 2022    Pre-Operative Diagnosis: Refractory morbid obesity with multiple comorbid conditions.  GERD    Post-operative Diagnosis: Same, Hiatal Hernia    Primary Procedure  [44996] Stalin-En-Y Gastric Bypass - Laparoscopic     Secondary Procedure(s)  [80685] Uppr Gi Endoscopy, Diagnosis   [38402] Transversus Abdominis Plan (TAP) Block Bilateral   [52891] Hiatal Hernia Repair - Laparoscopic     Surgeon: Lucian Ferguson M.D., FACS   Assistant surgeon: Fatou Dumont RPA     Anesthesia type: General Anesthesia     ICD9 Code   Diagnosis   [E66.01]   MORBID SEVERE OBES D/T EXCESS BO (Stable)   [Z68.41]   BODY MASS INDEX 40.0-44.9 ADULT (Stable)   [Z98.84]   BARIATRIC SURGERY STATUS (Stable)   [K44.9]   HIATAL HERNIA      Estimated blood loss: 30 ml       DRAINS: none    COMPLICATIONS: none    INDICATIONS FOR THE PROCEDURE:  The patient is a 31-year old female who had a sleeve gastrectomy in . The patient did well with weight loss and developed severe reflux as a result of the sleeve gastrectomy. The patient had an upper endoscopy which showed significant reflux with esophagitis. She was indicated for conversion of her sleeve gastrectomy to Stalin-en-Y gastric bypass.    DESCRIPTION OF PROCEDURE: The patient was identified properly via a time-out. The patient was brought into the operating room and was placed on the operating room table in supine position. The patient was then given general endotracheal anesthesia and was given preoperative antibiotics. Preoperative heparin was given in the ambulatory surgery unit. The patient was then prepped and draped in the usual sterile fashion. An Exparel mixture was mixed at the back table with 20 mL of Exparel, 30 mL of 0.25% Marcaine and 150 mL of normal saline. A Veress needle was placed in the left upper quadrant. The abdomen was insufflated to 15 millimeters of mercury. Once the abdomen was insufflated, the Exparel mixture was given subcutaneously at the sites of incision. An incision was made within the umbilicus and a 12-millimeter trocar was placed in the abdomen. The laparoscope was inserted and there was no injury on initial trocar placement or initial Veress needle placement. A Transversus Abdominus Plane Block was then conducted by placing 20 mL of the Exparel mixture preperitoneal at the distribution of the spinal nerves on the lateral abdominal wall. This was started at the costal margin at the mid axillary line. 20cc of the Exparel mixture was placed in this area. Another 20 mL was placed four centimeters caudal to this area. Another 20 mL was placed four centimeters caudal to this area and another 15 mL was placed four centimeters caudal to this area. This was repeated on the opposite side of the body in a similar fashion. The rest of the Exparel was placed into the subcutaneous tissues and preperitoneal at every trocar site. Under direct vision, the 12 mm trocar was placed in the right upper quadrant and mid clavicular line and a 5 mm trocar was placed in the right upper quadrant in the anterior axillary line. A 12 mm and 5 mm trocar was placed in the left upper quadrant. A 5 mm subxiphoid incision was then made and through this the Jesus Manuel liver retractor was inserted and was held in place using the medi-flex device. The patient was then placed into steep reverse Trendelenburg position. The ligament of Treitz was identified after retracting the colon and greater omentum superiorly. The small bowel was measured for 150 cm from the ligament of Treitz and divided using a 60 mm I drive stapler using the tan load. The Stalin limb was then measured at this point for 150 cm. The biliopancreatic limb and the Stalin limb were then approximated using two 2-0 Surgidac sutures using the Endostitch device. Enterotomies were made in both these limbs. A single firing of the 60 mm tan load was then used to create a functional side to side anastomosis. The enterotomies sites were closed using a running 3-0 absorbable suture using the Endostitch device. On completion of the anastomosis, the mesenteric defects between the small bowel mesentery were closed with the 2-0 VLOC suture using the Endostitch device. The greater omentum was then divided longitudinally to create space for the Stalin limb to come into the anticolic and antigastric position. A moderate sized hiatal hernia was identified. The stomach is gently retracted into the abdomen to assess its degree of tethering in the thorax. The peritoneum overlying the right gosia is incised, and the plane along the hernia sac is developed. The dissection is extended anteriorly and laterally to the left gosia. The base of the crural confluence is dissected free of adhesions. The hernia sac is carefully dissected into the mediastinum with caudal traction. The esophagus is identified and dissected circumferentially and along its mediastinal course in order to reduce tension, allowing the gastroesophageal junction to rest comfortably within the abdominal cavity. The crural pillars are then approximated with 1 2-0 VLOC sutures posteriorly. The tightness of the repair is gauged visually. At this time, the upper part of the sleeve gastrectomy was dissected and transected approxiametly 3 cm from the GEJ. A 60 mm purple load was used to create a transverse cut into the stomach for the creation of the pouch. The excess Harjinder fundus was excised and removed from the body. The Stalin limb and the gastric pouch were then approximated using 3-0 VLOC suture using the Endostitch device. Enterotomies measuring approximately 1 cm were made in both the gastric pouch and the Stalin limb. A completely hand sewn anastomosis was then performed using an inner layer of running Vicryl suture and we then performed an outer layer of running 3-0 suture in a Lembert fashion. After completion of the anastomosis, the Stalin limb was clamped and in intraoperative endoscopy was performed. It was found that there was no bleeding or stricture at the anastomosis, and after pumping air and submerging the anastomosis underwater no leak was found. Buchanan's defect was then closed with a 2-0 VLOC Hemostasis was assured. The abdominal cavity was irrigated and suctioned. Satisfied with the surgery at this point, the liver retractor was removed under direct vision. The remaining trocars were then removed. The skin was then closed with running Monocryl sutures and dermabond was applied over that. The patient tolerated the procedure well. The patient was extubated in the operating room.    Disposition  To recovery room, VS stable.

## 2022-04-25 ENCOUNTER — EMERGENCY (EMERGENCY)
Facility: HOSPITAL | Age: 32
LOS: 1 days | Discharge: HOME | End: 2022-04-25
Attending: STUDENT IN AN ORGANIZED HEALTH CARE EDUCATION/TRAINING PROGRAM
Payer: MEDICAID

## 2022-04-25 VITALS
HEIGHT: 64 IN | HEART RATE: 98 BPM | DIASTOLIC BLOOD PRESSURE: 59 MMHG | WEIGHT: 210.1 LBS | OXYGEN SATURATION: 98 % | RESPIRATION RATE: 18 BRPM | SYSTOLIC BLOOD PRESSURE: 130 MMHG

## 2022-04-25 VITALS
OXYGEN SATURATION: 99 % | SYSTOLIC BLOOD PRESSURE: 108 MMHG | HEART RATE: 80 BPM | RESPIRATION RATE: 18 BRPM | DIASTOLIC BLOOD PRESSURE: 78 MMHG

## 2022-04-25 DIAGNOSIS — Z98.890 OTHER SPECIFIED POSTPROCEDURAL STATES: Chronic | ICD-10-CM

## 2022-04-25 DIAGNOSIS — Z98.891 HISTORY OF UTERINE SCAR FROM PREVIOUS SURGERY: Chronic | ICD-10-CM

## 2022-04-25 DIAGNOSIS — Z98.84 BARIATRIC SURGERY STATUS: Chronic | ICD-10-CM

## 2022-04-25 PROBLEM — E66.01 MORBID (SEVERE) OBESITY DUE TO EXCESS CALORIES: Chronic | Status: ACTIVE | Noted: 2022-02-01

## 2022-04-25 PROBLEM — R13.10 DYSPHAGIA, UNSPECIFIED: Chronic | Status: ACTIVE | Noted: 2022-02-01

## 2022-04-25 PROBLEM — K21.9 GASTRO-ESOPHAGEAL REFLUX DISEASE WITHOUT ESOPHAGITIS: Chronic | Status: ACTIVE | Noted: 2022-02-01

## 2022-04-25 PROCEDURE — 96375 TX/PRO/DX INJ NEW DRUG ADDON: CPT

## 2022-04-25 PROCEDURE — 99284 EMERGENCY DEPT VISIT MOD MDM: CPT

## 2022-04-25 PROCEDURE — 99284 EMERGENCY DEPT VISIT MOD MDM: CPT | Mod: 25

## 2022-04-25 PROCEDURE — 96361 HYDRATE IV INFUSION ADD-ON: CPT

## 2022-04-25 PROCEDURE — 96374 THER/PROPH/DIAG INJ IV PUSH: CPT

## 2022-04-25 RX ORDER — SODIUM CHLORIDE 9 MG/ML
1000 INJECTION, SOLUTION INTRAVENOUS ONCE
Refills: 0 | Status: COMPLETED | OUTPATIENT
Start: 2022-04-25 | End: 2022-04-25

## 2022-04-25 RX ORDER — FAMOTIDINE 10 MG/ML
20 INJECTION INTRAVENOUS ONCE
Refills: 0 | Status: COMPLETED | OUTPATIENT
Start: 2022-04-25 | End: 2022-04-25

## 2022-04-25 RX ORDER — SODIUM CHLORIDE 9 MG/ML
1000 INJECTION INTRAMUSCULAR; INTRAVENOUS; SUBCUTANEOUS ONCE
Refills: 0 | Status: COMPLETED | OUTPATIENT
Start: 2022-04-25 | End: 2022-04-25

## 2022-04-25 RX ORDER — DIPHENHYDRAMINE HCL 50 MG
50 CAPSULE ORAL ONCE
Refills: 0 | Status: COMPLETED | OUTPATIENT
Start: 2022-04-25 | End: 2022-04-25

## 2022-04-25 RX ORDER — EPINEPHRINE 0.3 MG/.3ML
1 INJECTION INTRAMUSCULAR; SUBCUTANEOUS
Qty: 1 | Refills: 0
Start: 2022-04-25

## 2022-04-25 RX ADMIN — SODIUM CHLORIDE 1000 MILLILITER(S): 9 INJECTION INTRAMUSCULAR; INTRAVENOUS; SUBCUTANEOUS at 09:13

## 2022-04-25 RX ADMIN — SODIUM CHLORIDE 1000 MILLILITER(S): 9 INJECTION, SOLUTION INTRAVENOUS at 11:45

## 2022-04-25 RX ADMIN — Medication 125 MILLIGRAM(S): at 08:45

## 2022-04-25 RX ADMIN — FAMOTIDINE 20 MILLIGRAM(S): 10 INJECTION INTRAVENOUS at 09:13

## 2022-04-25 RX ADMIN — SODIUM CHLORIDE 1000 MILLILITER(S): 9 INJECTION INTRAMUSCULAR; INTRAVENOUS; SUBCUTANEOUS at 10:14

## 2022-04-25 RX ADMIN — Medication 50 MILLIGRAM(S): at 08:45

## 2022-04-25 NOTE — ED ADULT NURSE NOTE - OBJECTIVE STATEMENT
assumed pt care at 0840.  pt brought to critical care c/o allergic reaction that began at 0800 today after eating a tuna fish sandwich.  pt states she immediately began feeling flushed with redness to entire body, vomiting then began feeling like she was going to pass out.  symptoms resolving upon arrival to ED without any medication PTA.  resp even and unlabored, speech clear, no signs of resp distress.

## 2022-04-25 NOTE — ED ADULT TRIAGE NOTE - CHIEF COMPLAINT QUOTE
pta+ox3, ambulatory into ED c/o allergic reaction after eating tuna this morning. pt reports diff breathing, itchiness and rash to entire body. pt brought to critical care.

## 2022-04-25 NOTE — ED PROVIDER NOTE - OBJECTIVE STATEMENT
31 yof no sig pmh here with allergic rxn. Started after eating tuna fish sandwich around 8a, felt body get flush with itchy rash, throat discomfort with trouble breathing and blurry vision. Reports on arrival to ED, breathing is back to normal and vision is normal, still with itchy skin and diffuse rash. No meds prior to arrival. No known allergens. Denies cp, abd pain, nausea.

## 2022-04-25 NOTE — ED PROVIDER NOTE - PATIENT PORTAL LINK FT
You can access the FollowMyHealth Patient Portal offered by [facility] by registering at the following website: [email or web address]. By joining [employer]’s FollowMyHealth portal, you will also be able to view your health information using other applications (apps) compatible with our system.

## 2022-04-25 NOTE — ED PROVIDER NOTE - CLINICAL SUMMARY MEDICAL DECISION MAKING FREE TEXT BOX
allergic type rxn after eating tuna fish. diffuse rash noted. no trouble breathing now, overall feeling better. will treat supportively, monitor for rebound. reassess

## 2022-04-25 NOTE — ED ADULT NURSE NOTE - NSIMPLEMENTINTERV_GEN_ALL_ED
Implemented All Universal Safety Interventions:  [place] to call system. Call bell, personal items and telephone within reach. Instruct patient to call for assistance. Room bathroom lighting operational. Non-slip footwear when patient is off stretcher. Physically safe environment: no spills, clutter or unnecessary equipment. Stretcher in lowest position, wheels locked, appropriate side rails in place.

## 2022-04-25 NOTE — ED PROVIDER NOTE - PHYSICAL EXAMINATION
Vital Signs per nursing documentation  Gen: well appearing, no acute distress  HEENT: NCAT, MMM, oropharynx clear, uvula midline   Cardiac: tachycardic S1S2  Chest: clear to auscultation bilateral, no wheezes or crackles  Abdomen: soft, non tender non distended  Extremity: no gross deformity, good perfusion  Skin: diffuse maculopapular rash in chest and upper extremities  Neuro: nonfocal neuro exam

## 2022-04-25 NOTE — ED PROVIDER NOTE - PROGRESS NOTE DETAILS
rash improving, breathing comfortably. will continue to monitor ~4h patient feels better. no sob or difficulty breathing. rash improved. return preacutiosn and outpt f/u

## 2022-11-17 ENCOUNTER — TRANSCRIPTION ENCOUNTER (OUTPATIENT)
Age: 32
End: 2022-11-17

## 2022-11-17 ENCOUNTER — INPATIENT (INPATIENT)
Facility: HOSPITAL | Age: 32
LOS: 1 days | Discharge: HOME | DRG: 419 | End: 2022-11-19
Attending: STUDENT IN AN ORGANIZED HEALTH CARE EDUCATION/TRAINING PROGRAM | Admitting: STUDENT IN AN ORGANIZED HEALTH CARE EDUCATION/TRAINING PROGRAM
Payer: MEDICAID

## 2022-11-17 VITALS
DIASTOLIC BLOOD PRESSURE: 54 MMHG | WEIGHT: 175.05 LBS | TEMPERATURE: 97 F | SYSTOLIC BLOOD PRESSURE: 102 MMHG | HEART RATE: 88 BPM | OXYGEN SATURATION: 100 % | RESPIRATION RATE: 16 BRPM

## 2022-11-17 DIAGNOSIS — K81.9 CHOLECYSTITIS, UNSPECIFIED: ICD-10-CM

## 2022-11-17 DIAGNOSIS — Z98.890 OTHER SPECIFIED POSTPROCEDURAL STATES: Chronic | ICD-10-CM

## 2022-11-17 DIAGNOSIS — Z98.891 HISTORY OF UTERINE SCAR FROM PREVIOUS SURGERY: Chronic | ICD-10-CM

## 2022-11-17 DIAGNOSIS — Z98.84 BARIATRIC SURGERY STATUS: Chronic | ICD-10-CM

## 2022-11-17 LAB
ALBUMIN SERPL ELPH-MCNC: 3.5 G/DL — SIGNIFICANT CHANGE UP (ref 3.3–5.2)
ALP SERPL-CCNC: 78 U/L — SIGNIFICANT CHANGE UP (ref 40–120)
ALT FLD-CCNC: 17 U/L — SIGNIFICANT CHANGE UP
ANION GAP SERPL CALC-SCNC: 11 MMOL/L — SIGNIFICANT CHANGE UP (ref 5–17)
AST SERPL-CCNC: 43 U/L — HIGH
BASOPHILS # BLD AUTO: 0.03 K/UL — SIGNIFICANT CHANGE UP (ref 0–0.2)
BASOPHILS NFR BLD AUTO: 0.5 % — SIGNIFICANT CHANGE UP (ref 0–2)
BILIRUB SERPL-MCNC: 0.4 MG/DL — SIGNIFICANT CHANGE UP (ref 0.4–2)
BUN SERPL-MCNC: 10.4 MG/DL — SIGNIFICANT CHANGE UP (ref 8–20)
CALCIUM SERPL-MCNC: 8.6 MG/DL — SIGNIFICANT CHANGE UP (ref 8.4–10.5)
CHLORIDE SERPL-SCNC: 105 MMOL/L — SIGNIFICANT CHANGE UP (ref 96–108)
CO2 SERPL-SCNC: 24 MMOL/L — SIGNIFICANT CHANGE UP (ref 22–29)
CREAT SERPL-MCNC: 0.74 MG/DL — SIGNIFICANT CHANGE UP (ref 0.5–1.3)
EGFR: 112 ML/MIN/1.73M2 — SIGNIFICANT CHANGE UP
EOSINOPHIL # BLD AUTO: 0.13 K/UL — SIGNIFICANT CHANGE UP (ref 0–0.5)
EOSINOPHIL NFR BLD AUTO: 2 % — SIGNIFICANT CHANGE UP (ref 0–6)
GLUCOSE SERPL-MCNC: 127 MG/DL — HIGH (ref 70–99)
HCG SERPL-ACNC: <4 MIU/ML — SIGNIFICANT CHANGE UP
HCT VFR BLD CALC: 30.9 % — LOW (ref 34.5–45)
HGB BLD-MCNC: 9.4 G/DL — LOW (ref 11.5–15.5)
IMM GRANULOCYTES NFR BLD AUTO: 0.3 % — SIGNIFICANT CHANGE UP (ref 0–0.9)
LIDOCAIN IGE QN: 25 U/L — SIGNIFICANT CHANGE UP (ref 22–51)
LYMPHOCYTES # BLD AUTO: 1.61 K/UL — SIGNIFICANT CHANGE UP (ref 1–3.3)
LYMPHOCYTES # BLD AUTO: 24.6 % — SIGNIFICANT CHANGE UP (ref 13–44)
MAGNESIUM SERPL-MCNC: 1.9 MG/DL — SIGNIFICANT CHANGE UP (ref 1.6–2.6)
MCHC RBC-ENTMCNC: 23.4 PG — LOW (ref 27–34)
MCHC RBC-ENTMCNC: 30.4 GM/DL — LOW (ref 32–36)
MCV RBC AUTO: 76.9 FL — LOW (ref 80–100)
MONOCYTES # BLD AUTO: 0.59 K/UL — SIGNIFICANT CHANGE UP (ref 0–0.9)
MONOCYTES NFR BLD AUTO: 9 % — SIGNIFICANT CHANGE UP (ref 2–14)
NEUTROPHILS # BLD AUTO: 4.17 K/UL — SIGNIFICANT CHANGE UP (ref 1.8–7.4)
NEUTROPHILS NFR BLD AUTO: 63.6 % — SIGNIFICANT CHANGE UP (ref 43–77)
PLATELET # BLD AUTO: 226 K/UL — SIGNIFICANT CHANGE UP (ref 150–400)
POTASSIUM SERPL-MCNC: 3.7 MMOL/L — SIGNIFICANT CHANGE UP (ref 3.5–5.3)
POTASSIUM SERPL-SCNC: 3.7 MMOL/L — SIGNIFICANT CHANGE UP (ref 3.5–5.3)
PROT SERPL-MCNC: 6.4 G/DL — LOW (ref 6.6–8.7)
RBC # BLD: 4.02 M/UL — SIGNIFICANT CHANGE UP (ref 3.8–5.2)
RBC # FLD: 18.6 % — HIGH (ref 10.3–14.5)
SARS-COV-2 RNA SPEC QL NAA+PROBE: SIGNIFICANT CHANGE UP
SODIUM SERPL-SCNC: 140 MMOL/L — SIGNIFICANT CHANGE UP (ref 135–145)
WBC # BLD: 6.55 K/UL — SIGNIFICANT CHANGE UP (ref 3.8–10.5)
WBC # FLD AUTO: 6.55 K/UL — SIGNIFICANT CHANGE UP (ref 3.8–10.5)

## 2022-11-17 PROCEDURE — 99222 1ST HOSP IP/OBS MODERATE 55: CPT

## 2022-11-17 RX ORDER — PIPERACILLIN AND TAZOBACTAM 4; .5 G/20ML; G/20ML
3.38 INJECTION, POWDER, LYOPHILIZED, FOR SOLUTION INTRAVENOUS ONCE
Refills: 0 | Status: COMPLETED | OUTPATIENT
Start: 2022-11-18 | End: 2022-11-18

## 2022-11-17 RX ORDER — TRAMADOL HYDROCHLORIDE 50 MG/1
50 TABLET ORAL EVERY 6 HOURS
Refills: 0 | Status: DISCONTINUED | OUTPATIENT
Start: 2022-11-17 | End: 2022-11-18

## 2022-11-17 RX ORDER — SODIUM CHLORIDE 9 MG/ML
1000 INJECTION, SOLUTION INTRAVENOUS ONCE
Refills: 0 | Status: COMPLETED | OUTPATIENT
Start: 2022-11-17 | End: 2022-11-17

## 2022-11-17 RX ORDER — FAMOTIDINE 10 MG/ML
20 INJECTION INTRAVENOUS ONCE
Refills: 0 | Status: COMPLETED | OUTPATIENT
Start: 2022-11-17 | End: 2022-11-17

## 2022-11-17 RX ORDER — PIPERACILLIN AND TAZOBACTAM 4; .5 G/20ML; G/20ML
3.38 INJECTION, POWDER, LYOPHILIZED, FOR SOLUTION INTRAVENOUS ONCE
Refills: 0 | Status: DISCONTINUED | OUTPATIENT
Start: 2022-11-17 | End: 2022-11-17

## 2022-11-17 RX ORDER — PANTOPRAZOLE SODIUM 20 MG/1
40 TABLET, DELAYED RELEASE ORAL
Refills: 0 | Status: DISCONTINUED | OUTPATIENT
Start: 2022-11-17 | End: 2022-11-18

## 2022-11-17 RX ORDER — PIPERACILLIN AND TAZOBACTAM 4; .5 G/20ML; G/20ML
3.38 INJECTION, POWDER, LYOPHILIZED, FOR SOLUTION INTRAVENOUS ONCE
Refills: 0 | Status: COMPLETED | OUTPATIENT
Start: 2022-11-17 | End: 2022-11-17

## 2022-11-17 RX ORDER — MORPHINE SULFATE 50 MG/1
4 CAPSULE, EXTENDED RELEASE ORAL ONCE
Refills: 0 | Status: DISCONTINUED | OUTPATIENT
Start: 2022-11-17 | End: 2022-11-17

## 2022-11-17 RX ORDER — KETOROLAC TROMETHAMINE 30 MG/ML
15 SYRINGE (ML) INJECTION ONCE
Refills: 0 | Status: DISCONTINUED | OUTPATIENT
Start: 2022-11-17 | End: 2022-11-17

## 2022-11-17 RX ORDER — ACETAMINOPHEN 500 MG
975 TABLET ORAL EVERY 6 HOURS
Refills: 0 | Status: DISCONTINUED | OUTPATIENT
Start: 2022-11-17 | End: 2022-11-18

## 2022-11-17 RX ORDER — PIPERACILLIN AND TAZOBACTAM 4; .5 G/20ML; G/20ML
3.38 INJECTION, POWDER, LYOPHILIZED, FOR SOLUTION INTRAVENOUS ONCE
Refills: 0 | Status: DISCONTINUED | OUTPATIENT
Start: 2022-11-18 | End: 2022-11-18

## 2022-11-17 RX ORDER — PIPERACILLIN AND TAZOBACTAM 4; .5 G/20ML; G/20ML
3.38 INJECTION, POWDER, LYOPHILIZED, FOR SOLUTION INTRAVENOUS EVERY 8 HOURS
Refills: 0 | Status: DISCONTINUED | OUTPATIENT
Start: 2022-11-18 | End: 2022-11-18

## 2022-11-17 RX ORDER — ONDANSETRON 8 MG/1
4 TABLET, FILM COATED ORAL ONCE
Refills: 0 | Status: COMPLETED | OUTPATIENT
Start: 2022-11-17 | End: 2022-11-17

## 2022-11-17 RX ORDER — OXYCODONE HYDROCHLORIDE 5 MG/1
5 TABLET ORAL EVERY 6 HOURS
Refills: 0 | Status: DISCONTINUED | OUTPATIENT
Start: 2022-11-17 | End: 2022-11-18

## 2022-11-17 RX ORDER — ENOXAPARIN SODIUM 100 MG/ML
40 INJECTION SUBCUTANEOUS EVERY 24 HOURS
Refills: 0 | Status: DISCONTINUED | OUTPATIENT
Start: 2022-11-17 | End: 2022-11-18

## 2022-11-17 RX ORDER — SODIUM CHLORIDE 9 MG/ML
1000 INJECTION, SOLUTION INTRAVENOUS
Refills: 0 | Status: DISCONTINUED | OUTPATIENT
Start: 2022-11-17 | End: 2022-11-18

## 2022-11-17 RX ADMIN — MORPHINE SULFATE 4 MILLIGRAM(S): 50 CAPSULE, EXTENDED RELEASE ORAL at 06:24

## 2022-11-17 RX ADMIN — SODIUM CHLORIDE 120 MILLILITER(S): 9 INJECTION, SOLUTION INTRAVENOUS at 18:31

## 2022-11-17 RX ADMIN — ONDANSETRON 4 MILLIGRAM(S): 8 TABLET, FILM COATED ORAL at 06:25

## 2022-11-17 RX ADMIN — MORPHINE SULFATE 4 MILLIGRAM(S): 50 CAPSULE, EXTENDED RELEASE ORAL at 17:13

## 2022-11-17 RX ADMIN — PIPERACILLIN AND TAZOBACTAM 200 GRAM(S): 4; .5 INJECTION, POWDER, LYOPHILIZED, FOR SOLUTION INTRAVENOUS at 16:31

## 2022-11-17 RX ADMIN — SODIUM CHLORIDE 1000 MILLILITER(S): 9 INJECTION, SOLUTION INTRAVENOUS at 06:24

## 2022-11-17 RX ADMIN — FAMOTIDINE 20 MILLIGRAM(S): 10 INJECTION INTRAVENOUS at 06:25

## 2022-11-17 RX ADMIN — Medication 15 MILLIGRAM(S): at 20:04

## 2022-11-17 RX ADMIN — ONDANSETRON 4 MILLIGRAM(S): 8 TABLET, FILM COATED ORAL at 11:53

## 2022-11-17 RX ADMIN — PIPERACILLIN AND TAZOBACTAM 25 GRAM(S): 4; .5 INJECTION, POWDER, LYOPHILIZED, FOR SOLUTION INTRAVENOUS at 18:32

## 2022-11-17 RX ADMIN — Medication 975 MILLIGRAM(S): at 19:00

## 2022-11-17 RX ADMIN — Medication 15 MILLIGRAM(S): at 11:51

## 2022-11-17 RX ADMIN — Medication 15 MILLIGRAM(S): at 15:54

## 2022-11-17 RX ADMIN — Medication 15 MILLIGRAM(S): at 21:04

## 2022-11-17 RX ADMIN — Medication 975 MILLIGRAM(S): at 18:31

## 2022-11-17 NOTE — ED ADULT NURSE NOTE - CHIEF COMPLAINT QUOTE
Pt presents to ED c/o severe abdominal pain + vomiting since 0500. Hx of gastric bypass surgery in February. Patient's skin is pale and diaphoretic.

## 2022-11-17 NOTE — H&P ADULT - ATTENDING COMMENTS
I have seen and examined this patient with the resident.  This is a 32-year-old female with PMHx LSG converted to RNYGB in 2022, and prior   who presents to [facility] ED with abdominal pain.  Pain is associated with chills, nausea/vomiting, and subjective fevers.  She states she has had this pain before, but it is now persisting.  Imaging studies are consistent with acute cholecystitis.  Labs demonstrate no elevated LFTs or leukocytosis.  On physical exam, she has a profound Van's sign with tenderness in the RUQ.  Based on my exam and the labs and imaging, this patient has acute cholecystitis and will require a laparoscopic cholecystectomy.  We will admit her to our service, keep her NPO, put her on IVF, start Zosyn, and book her for a laparoscopic cholecystectomy for tomorrow (and possibly tonight).

## 2022-11-17 NOTE — PATIENT PROFILE ADULT - FALL HARM RISK - UNIVERSAL INTERVENTIONS
Bed in lowest position, wheels locked, appropriate side rails in place/Call bell, personal items and telephone in reach/Instruct patient to call for assistance before getting out of bed or chair/Non-slip footwear when patient is out of bed/[place] to call system/Physically safe environment - no spills, clutter or unnecessary equipment/Purposeful Proactive Rounding/Room/bathroom lighting operational, light cord in reach

## 2022-11-17 NOTE — ED PROVIDER NOTE - PHYSICAL EXAMINATION
Const: Awake, alert and oriented. In mod acute distress.   HEENT: NC/AT. Moist mucous membranes.  Eyes: No scleral icterus. EOMI.  Neck:. Soft and supple. Full ROM without pain.  Cardiac: Regular rate and regular rhythm. +S1/S2. Peripheral pulses 2+ and symmetric. No LE edema.  Resp: Speaking in full sentences. No evidence of respiratory distress. No wheezes, rales or rhonchi.  Abd: Soft, ttp epigastrium, non-distended. Normal bowel sounds in all 4 quadrants. No guarding or rebound.  Back: Spine midline and non-tender. No CVAT.  Skin: No rashes, abrasions or lacerations.  Lymph: No cervical lymphadenopathy.  Neuro: Awake, alert & oriented x 3. Moves all extremities symmetrically.

## 2022-11-17 NOTE — H&P ADULT - HISTORY OF PRESENT ILLNESS
Patient is a 32yoF with PMH obesity who presents to the ED with epigastric/RUQ abdominal pain that began at approx 5am, associate with 5 episodes of NBNB emesis/retching and 1 day of diarrhea. Patient denies any prior similar episodes, has not had fevers, chills, chest pain, SOB, constipation, diarrhea.     PMH: obesity  PSH: sleeve gastrectomy, conversion to Stalin-en-Y bypass 22 @ [address]  7 years ago  Meds: bariatric multivitamins  Allergies: fish  SH: occasional marijuana use (last used last night), otherwise denies illicit drug use    Vitals:  Vital Signs Last 24 Hrs  T(C): 36.8 (2022 07:42), Max: 36.8 (2022 07:42)  T(F): 98.2 (2022 07:42), Max: 98.2 (2022 07:42)  HR: 60 (2022 11:38) (60 - 88)  BP: 104/58 (2022 11:38) (102/54 - 112/67)  BP(mean): --  RR: 16 (2022 11:38) (16 - 16)  SpO2: 100% (2022 11:38) (100% - 100%)    Parameters below as of 2022 07:42  Patient On (Oxygen Delivery Method): room air    Labs:      140  |  105  |  10.4  ----------------------------<  127<H>  3.7   |  24.0  |  0.74    Ca    8.6      2022 06:22  Mg     1.9         TPro  6.4<L>  /  Alb  3.5  /  TBili  0.4  /  DBili  x   /  AST  43<H>  /  ALT  17  /  AlkPhos  78                              9.4    6.55  )-----------( 226      ( 2022 06:22 )             30.9       Physical Exam:  Gen: pt lying in bed, alert, in NAD  Resp: unlabored  CVS: RRR  Abd: soft, ND, TTP in RUQ, + Van's sign  Ext: moving all extremities spontaneously, sensation intact, pulses 2+

## 2022-11-17 NOTE — ED ADULT NURSE NOTE - OBJECTIVE STATEMENT
pt presents to ed from home c.o abdominal pain since 0500.  sudden onset, woken up from sleep.  +n/v.  pt pale and diaphoretic.   awaiting CT.

## 2022-11-17 NOTE — H&P ADULT - ASSESSMENT
Patient is a 32yoF with PMH obesity who presents to the ED with epigastric/RUQ abdominal pain that began at approx 5am, associate with 5 episodes of NBNB emesis/retching and 1 day of diarrhea. Patient denies any prior similar episodes, has not had fevers, chills, chest pain, SOB, constipation, diarrhea.     In the ED, patient is afebrile and HD stable, bloodwork without leukocytosis (6.5) or neutrophilic shift (63%), LFTs WNL. CT A/P performed and shows distended gallbladder with trace pericholecystic fluid, RUQ US performed and shows dilated GB with sludge and stones, wall 5-6mm.    Plan:  -admit to surgery under Dr. Bajwa  -booked for lap vincenzo 11/18  -NPO/IVF  -IV abx  -pain control  -strict Is/Os  -continue home meds  -trend labs, replete electrolytes as needed  -encourage OOB  -incentive spirometry  -DVT ppx: SCDs, Lovenox 40 daily

## 2022-11-17 NOTE — ED ADULT NURSE NOTE - ALCOHOL PRE SCREEN (AUDIT - C)
TESS SHARIF   to  schedule  P T   ANGELITA  visit  Spoke  with  patients  brother  who r eports  patient  had  a  recent  change  in  his  psych  meds  and  is  experiencing  increased  lethargy  and  fatigue  Emily Rondon  reports  he  has  contatced  Bari WEBB  regarding  patients  increase  in  lethargy  Emily Rondon  is  requesting  that  P T   ANGELITA  be  held  until  week  of  8 8    Will  re  schedule  for  8 8   or  8 9 Statement Selected

## 2022-11-17 NOTE — ED ADULT NURSE REASSESSMENT NOTE - NS ED NURSE REASSESS COMMENT FT1
pt sitting calm in bed. a and o x3. breathing even and unlabored. nsr on cm. awaiting US for dispo. will continue to monitor.
sitting calm in bed. a and o x3. breathing even and unlabored. admitted and awaiting bed assignment. no complaints at this time. will continue to monitor.
received report from Edilma العراقي and assumed care of pt. pt sitting calm in bed. a and o x3. breathing even and unlabored. nsr on cm. awaiting ct for dispo. will continue to monitor.

## 2022-11-17 NOTE — ED PROVIDER NOTE - OBJECTIVE STATEMENT
31yo female with pmh of gastric sleeve by Dr. Ferguson presents with abd pain. Pt states she woke up at 5am with abd pain, epigastric 9/10 sharp non radiating a/w 5 episodes of nbnb emesis. Pt states last BM at midnight was normal. Pt denies fevers/chills, ha, loc, focal neuro deficits, cp/sob/palp, cough, diarrhea, urinary symptoms, recent travel and sick contacts.

## 2022-11-17 NOTE — ED ADULT TRIAGE NOTE - CHIEF COMPLAINT QUOTE
Pt presents to ED c/o severe abdominal pain + vomiting since 0500. Hx of gastric bypass surgery in February. Patient's skin is pale. Pt presents to ED c/o severe abdominal pain + vomiting since 0500. Hx of gastric bypass surgery in February. Patient's skin is pale and diaphoretic.

## 2022-11-17 NOTE — ED PROVIDER NOTE - CLINICAL SUMMARY MEDICAL DECISION MAKING FREE TEXT BOX
31yo female with pmh of gastric sleeve by Dr. Ferguson presents with abd pain/n/v, labs CTAP, pain control, antiemetic, IVF

## 2022-11-18 ENCOUNTER — TRANSCRIPTION ENCOUNTER (OUTPATIENT)
Age: 32
End: 2022-11-18

## 2022-11-18 ENCOUNTER — RESULT REVIEW (OUTPATIENT)
Age: 32
End: 2022-11-18

## 2022-11-18 LAB
ALBUMIN SERPL ELPH-MCNC: 3.1 G/DL — LOW (ref 3.3–5.2)
ALP SERPL-CCNC: 143 U/L — HIGH (ref 40–120)
ALT FLD-CCNC: 247 U/L — HIGH
ANION GAP SERPL CALC-SCNC: 9 MMOL/L — SIGNIFICANT CHANGE UP (ref 5–17)
APTT BLD: 30 SEC — SIGNIFICANT CHANGE UP (ref 27.5–35.5)
AST SERPL-CCNC: 440 U/L — HIGH
BASOPHILS # BLD AUTO: 0.03 K/UL — SIGNIFICANT CHANGE UP (ref 0–0.2)
BASOPHILS NFR BLD AUTO: 0.8 % — SIGNIFICANT CHANGE UP (ref 0–2)
BILIRUB SERPL-MCNC: 0.6 MG/DL — SIGNIFICANT CHANGE UP (ref 0.4–2)
BUN SERPL-MCNC: 11.9 MG/DL — SIGNIFICANT CHANGE UP (ref 8–20)
CALCIUM SERPL-MCNC: 8.3 MG/DL — LOW (ref 8.4–10.5)
CHLORIDE SERPL-SCNC: 105 MMOL/L — SIGNIFICANT CHANGE UP (ref 96–108)
CO2 SERPL-SCNC: 25 MMOL/L — SIGNIFICANT CHANGE UP (ref 22–29)
CREAT SERPL-MCNC: 0.68 MG/DL — SIGNIFICANT CHANGE UP (ref 0.5–1.3)
EGFR: 120 ML/MIN/1.73M2 — SIGNIFICANT CHANGE UP
EOSINOPHIL # BLD AUTO: 0.12 K/UL — SIGNIFICANT CHANGE UP (ref 0–0.5)
EOSINOPHIL NFR BLD AUTO: 3.2 % — SIGNIFICANT CHANGE UP (ref 0–6)
GLUCOSE SERPL-MCNC: 86 MG/DL — SIGNIFICANT CHANGE UP (ref 70–99)
HCT VFR BLD CALC: 29.1 % — LOW (ref 34.5–45)
HGB BLD-MCNC: 8.4 G/DL — LOW (ref 11.5–15.5)
IMM GRANULOCYTES NFR BLD AUTO: 0.3 % — SIGNIFICANT CHANGE UP (ref 0–0.9)
INR BLD: 1.09 RATIO — SIGNIFICANT CHANGE UP (ref 0.88–1.16)
LYMPHOCYTES # BLD AUTO: 0.86 K/UL — LOW (ref 1–3.3)
LYMPHOCYTES # BLD AUTO: 22.6 % — SIGNIFICANT CHANGE UP (ref 13–44)
MAGNESIUM SERPL-MCNC: 2 MG/DL — SIGNIFICANT CHANGE UP (ref 1.6–2.6)
MCHC RBC-ENTMCNC: 22.8 PG — LOW (ref 27–34)
MCHC RBC-ENTMCNC: 28.9 GM/DL — LOW (ref 32–36)
MCV RBC AUTO: 78.9 FL — LOW (ref 80–100)
MONOCYTES # BLD AUTO: 0.41 K/UL — SIGNIFICANT CHANGE UP (ref 0–0.9)
MONOCYTES NFR BLD AUTO: 10.8 % — SIGNIFICANT CHANGE UP (ref 2–14)
NEUTROPHILS # BLD AUTO: 2.37 K/UL — SIGNIFICANT CHANGE UP (ref 1.8–7.4)
NEUTROPHILS NFR BLD AUTO: 62.3 % — SIGNIFICANT CHANGE UP (ref 43–77)
PHOSPHATE SERPL-MCNC: 3.8 MG/DL — SIGNIFICANT CHANGE UP (ref 2.4–4.7)
PLATELET # BLD AUTO: 207 K/UL — SIGNIFICANT CHANGE UP (ref 150–400)
POTASSIUM SERPL-MCNC: 3.9 MMOL/L — SIGNIFICANT CHANGE UP (ref 3.5–5.3)
POTASSIUM SERPL-SCNC: 3.9 MMOL/L — SIGNIFICANT CHANGE UP (ref 3.5–5.3)
PROT SERPL-MCNC: 5.7 G/DL — LOW (ref 6.6–8.7)
PROTHROM AB SERPL-ACNC: 12.6 SEC — SIGNIFICANT CHANGE UP (ref 10.5–13.4)
RBC # BLD: 3.69 M/UL — LOW (ref 3.8–5.2)
RBC # FLD: 18.5 % — HIGH (ref 10.3–14.5)
SODIUM SERPL-SCNC: 139 MMOL/L — SIGNIFICANT CHANGE UP (ref 135–145)
WBC # BLD: 3.8 K/UL — SIGNIFICANT CHANGE UP (ref 3.8–10.5)
WBC # FLD AUTO: 3.8 K/UL — SIGNIFICANT CHANGE UP (ref 3.8–10.5)

## 2022-11-18 PROCEDURE — 47562 LAPAROSCOPIC CHOLECYSTECTOMY: CPT

## 2022-11-18 PROCEDURE — 88304 TISSUE EXAM BY PATHOLOGIST: CPT | Mod: 26

## 2022-11-18 DEVICE — CLIP APPLIER COVIDIEN ENDOCLIP III 5MM: Type: IMPLANTABLE DEVICE | Status: FUNCTIONAL

## 2022-11-18 RX ORDER — TRAMADOL HYDROCHLORIDE 50 MG/1
25 TABLET ORAL EVERY 6 HOURS
Refills: 0 | Status: DISCONTINUED | OUTPATIENT
Start: 2022-11-18 | End: 2022-11-19

## 2022-11-18 RX ORDER — OMEPRAZOLE 10 MG/1
1 CAPSULE, DELAYED RELEASE ORAL
Qty: 0 | Refills: 0 | DISCHARGE

## 2022-11-18 RX ORDER — TRAMADOL HYDROCHLORIDE 50 MG/1
50 TABLET ORAL EVERY 6 HOURS
Refills: 0 | Status: DISCONTINUED | OUTPATIENT
Start: 2022-11-18 | End: 2022-11-19

## 2022-11-18 RX ORDER — ONDANSETRON 8 MG/1
4 TABLET, FILM COATED ORAL ONCE
Refills: 0 | Status: DISCONTINUED | OUTPATIENT
Start: 2022-11-18 | End: 2022-11-18

## 2022-11-18 RX ORDER — METOCLOPRAMIDE HCL 10 MG
10 TABLET ORAL ONCE
Refills: 0 | Status: COMPLETED | OUTPATIENT
Start: 2022-11-18 | End: 2022-11-18

## 2022-11-18 RX ORDER — HYDROMORPHONE HYDROCHLORIDE 2 MG/ML
0.5 INJECTION INTRAMUSCULAR; INTRAVENOUS; SUBCUTANEOUS ONCE
Refills: 0 | Status: DISCONTINUED | OUTPATIENT
Start: 2022-11-18 | End: 2022-11-18

## 2022-11-18 RX ORDER — KETOROLAC TROMETHAMINE 30 MG/ML
15 SYRINGE (ML) INJECTION ONCE
Refills: 0 | Status: DISCONTINUED | OUTPATIENT
Start: 2022-11-18 | End: 2022-11-18

## 2022-11-18 RX ORDER — ENOXAPARIN SODIUM 100 MG/ML
40 INJECTION SUBCUTANEOUS EVERY 24 HOURS
Refills: 0 | Status: DISCONTINUED | OUTPATIENT
Start: 2022-11-18 | End: 2022-11-19

## 2022-11-18 RX ORDER — ONDANSETRON 8 MG/1
4 TABLET, FILM COATED ORAL EVERY 4 HOURS
Refills: 0 | Status: DISCONTINUED | OUTPATIENT
Start: 2022-11-18 | End: 2022-11-18

## 2022-11-18 RX ORDER — SODIUM CHLORIDE 9 MG/ML
1000 INJECTION, SOLUTION INTRAVENOUS
Refills: 0 | Status: DISCONTINUED | OUTPATIENT
Start: 2022-11-18 | End: 2022-11-18

## 2022-11-18 RX ORDER — ACETAMINOPHEN 500 MG
1000 TABLET ORAL EVERY 6 HOURS
Refills: 0 | Status: COMPLETED | OUTPATIENT
Start: 2022-11-18 | End: 2022-11-19

## 2022-11-18 RX ORDER — ONDANSETRON 8 MG/1
4 TABLET, FILM COATED ORAL EVERY 4 HOURS
Refills: 0 | Status: DISCONTINUED | OUTPATIENT
Start: 2022-11-18 | End: 2022-11-19

## 2022-11-18 RX ORDER — PANTOPRAZOLE SODIUM 20 MG/1
40 TABLET, DELAYED RELEASE ORAL
Refills: 0 | Status: DISCONTINUED | OUTPATIENT
Start: 2022-11-18 | End: 2022-11-19

## 2022-11-18 RX ORDER — FENTANYL CITRATE 50 UG/ML
50 INJECTION INTRAVENOUS
Refills: 0 | Status: DISCONTINUED | OUTPATIENT
Start: 2022-11-18 | End: 2022-11-18

## 2022-11-18 RX ORDER — ACETAMINOPHEN 500 MG
2 TABLET ORAL
Qty: 0 | Refills: 0 | DISCHARGE

## 2022-11-18 RX ORDER — ACETAMINOPHEN 500 MG
975 TABLET ORAL EVERY 6 HOURS
Refills: 0 | Status: DISCONTINUED | OUTPATIENT
Start: 2022-11-18 | End: 2022-11-18

## 2022-11-18 RX ORDER — PIPERACILLIN AND TAZOBACTAM 4; .5 G/20ML; G/20ML
3.38 INJECTION, POWDER, LYOPHILIZED, FOR SOLUTION INTRAVENOUS ONCE
Refills: 0 | Status: DISCONTINUED | OUTPATIENT
Start: 2022-11-18 | End: 2022-11-18

## 2022-11-18 RX ORDER — IBUPROFEN 200 MG
1 TABLET ORAL
Qty: 0 | Refills: 0 | DISCHARGE

## 2022-11-18 RX ORDER — OMEPRAZOLE 10 MG/1
1 CAPSULE, DELAYED RELEASE ORAL
Qty: 0 | Refills: 0 | DISCHARGE
Start: 2022-11-18

## 2022-11-18 RX ADMIN — ENOXAPARIN SODIUM 40 MILLIGRAM(S): 100 INJECTION SUBCUTANEOUS at 16:52

## 2022-11-18 RX ADMIN — HYDROMORPHONE HYDROCHLORIDE 0.5 MILLIGRAM(S): 2 INJECTION INTRAMUSCULAR; INTRAVENOUS; SUBCUTANEOUS at 15:26

## 2022-11-18 RX ADMIN — FENTANYL CITRATE 50 MICROGRAM(S): 50 INJECTION INTRAVENOUS at 12:39

## 2022-11-18 RX ADMIN — ONDANSETRON 4 MILLIGRAM(S): 8 TABLET, FILM COATED ORAL at 16:50

## 2022-11-18 RX ADMIN — FENTANYL CITRATE 50 MICROGRAM(S): 50 INJECTION INTRAVENOUS at 12:09

## 2022-11-18 RX ADMIN — Medication 400 MILLIGRAM(S): at 18:29

## 2022-11-18 RX ADMIN — HYDROMORPHONE HYDROCHLORIDE 0.5 MILLIGRAM(S): 2 INJECTION INTRAMUSCULAR; INTRAVENOUS; SUBCUTANEOUS at 15:11

## 2022-11-18 RX ADMIN — Medication 10 MILLIGRAM(S): at 12:01

## 2022-11-18 RX ADMIN — Medication 1000 MILLIGRAM(S): at 18:44

## 2022-11-18 RX ADMIN — Medication 15 MILLIGRAM(S): at 21:15

## 2022-11-18 RX ADMIN — PIPERACILLIN AND TAZOBACTAM 25 GRAM(S): 4; .5 INJECTION, POWDER, LYOPHILIZED, FOR SOLUTION INTRAVENOUS at 03:43

## 2022-11-18 RX ADMIN — Medication 975 MILLIGRAM(S): at 16:48

## 2022-11-18 RX ADMIN — ONDANSETRON 4 MILLIGRAM(S): 8 TABLET, FILM COATED ORAL at 22:41

## 2022-11-18 RX ADMIN — Medication 975 MILLIGRAM(S): at 17:48

## 2022-11-18 NOTE — PROGRESS NOTE ADULT - ASSESSMENT
Patient is a 32yoF with PMH obesity who presents to the ED with epigastric/RUQ abdominal pain that began at approx 5am, associate with 5 episodes of NBNB emesis/retching and 1 day of diarrhea, admitted with acute cholecystitis    Plan:  -booked for lap vincenzo 11/18  -NPO/IVF  -IV abx  -pain control  -strict Is/Os  -continue home meds  -trend labs, replete electrolytes as needed  -encourage OOB  -incentive spirometry  -DVT ppx: SCDs, Lovenox 40 daily

## 2022-11-18 NOTE — PROVIDER CONTACT NOTE (CHANGE IN STATUS NOTIFICATION) - SITUATION
pt received back from OR, pt inconsolable in pain, vitals stable, pt complaining of generalized pain. abdomen soft.
- - -

## 2022-11-18 NOTE — DISCHARGE NOTE PROVIDER - NSDCMRMEDTOKEN_GEN_ALL_CORE_FT
Motrin 600 mg oral tablet: 1 tab(s) orally every 6 hours, As Needed  omeprazole 40 mg oral delayed release capsule: 1 cap(s) orally once a day  Tylenol 500 mg oral tablet: 2 tab(s) orally every 8 hours, As Needed

## 2022-11-18 NOTE — DISCHARGE NOTE PROVIDER - NSDCCPCAREPLAN_GEN_ALL_CORE_FT
PRINCIPAL DISCHARGE DIAGNOSIS  Diagnosis: Cholecystitis  Assessment and Plan of Treatment:   BATHING: Please do not submerge wound underwater. You may shower and/or sponge bathe.   ACTIVITY: No heavy lifting or straining. Otherwise, you may return to your usual level of physical activity. If you are taking narcotic pain medication (such as Percocet) DO NOT drive a car, operate machinery or make important decisions.  DIET: Return to your usual diet.  NOTIFY YOUR SURGEON IF: You have any bleeding that does not stop, any pus draining from your wound(s), any fever (over 100.4 F) or chills, persistent nausea/vomiting, persistent diarrhea, or if your pain is not controlled on your discharge pain medications.  FOLLOW-UP: Please follow up with your primary care physician and Acute Care Surgery clinic [phone] in 10-14 days regarding your hospitalization. Call for appointment upon discharge.

## 2022-11-18 NOTE — DISCHARGE NOTE PROVIDER - CARE PROVIDER_API CALL
Sedrick Bajwa)  Surgery Trauma  [address] [place] [address]  Phone: [phone]  Fax: [phone]  Follow Up Time: 2 weeks

## 2022-11-18 NOTE — DISCHARGE NOTE PROVIDER - NSFOLLOWUPCLINICS_GEN_ALL_ED_FT
[facility] Acute Care Surgery  Acute Care Surgery  [address] [place] [id number]  Phone: [phone]  Fax:   Follow Up Time: 2 weeks

## 2022-11-19 ENCOUNTER — TRANSCRIPTION ENCOUNTER (OUTPATIENT)
Age: 32
End: 2022-11-19

## 2022-11-19 VITALS
HEART RATE: 65 BPM | SYSTOLIC BLOOD PRESSURE: 120 MMHG | RESPIRATION RATE: 16 BRPM | OXYGEN SATURATION: 96 % | DIASTOLIC BLOOD PRESSURE: 82 MMHG

## 2022-11-19 LAB
ALBUMIN SERPL ELPH-MCNC: 3.7 G/DL — SIGNIFICANT CHANGE UP (ref 3.3–5.2)
ALP SERPL-CCNC: 152 U/L — HIGH (ref 40–120)
ALT FLD-CCNC: 211 U/L — HIGH
ANION GAP SERPL CALC-SCNC: 13 MMOL/L — SIGNIFICANT CHANGE UP (ref 5–17)
AST SERPL-CCNC: 198 U/L — HIGH
BASOPHILS # BLD AUTO: 0.03 K/UL — SIGNIFICANT CHANGE UP (ref 0–0.2)
BASOPHILS NFR BLD AUTO: 0.2 % — SIGNIFICANT CHANGE UP (ref 0–2)
BILIRUB SERPL-MCNC: 0.6 MG/DL — SIGNIFICANT CHANGE UP (ref 0.4–2)
BUN SERPL-MCNC: 6.8 MG/DL — LOW (ref 8–20)
CALCIUM SERPL-MCNC: 8.7 MG/DL — SIGNIFICANT CHANGE UP (ref 8.4–10.5)
CHLORIDE SERPL-SCNC: 101 MMOL/L — SIGNIFICANT CHANGE UP (ref 96–108)
CO2 SERPL-SCNC: 24 MMOL/L — SIGNIFICANT CHANGE UP (ref 22–29)
CREAT SERPL-MCNC: 0.66 MG/DL — SIGNIFICANT CHANGE UP (ref 0.5–1.3)
EGFR: 121 ML/MIN/1.73M2 — SIGNIFICANT CHANGE UP
EOSINOPHIL # BLD AUTO: 0.01 K/UL — SIGNIFICANT CHANGE UP (ref 0–0.5)
EOSINOPHIL NFR BLD AUTO: 0.1 % — SIGNIFICANT CHANGE UP (ref 0–6)
GLUCOSE SERPL-MCNC: 99 MG/DL — SIGNIFICANT CHANGE UP (ref 70–99)
HCT VFR BLD CALC: 32.1 % — LOW (ref 34.5–45)
HGB BLD-MCNC: 9.6 G/DL — LOW (ref 11.5–15.5)
IMM GRANULOCYTES NFR BLD AUTO: 0.3 % — SIGNIFICANT CHANGE UP (ref 0–0.9)
LYMPHOCYTES # BLD AUTO: 1.32 K/UL — SIGNIFICANT CHANGE UP (ref 1–3.3)
LYMPHOCYTES # BLD AUTO: 10.6 % — LOW (ref 13–44)
MAGNESIUM SERPL-MCNC: 1.8 MG/DL — SIGNIFICANT CHANGE UP (ref 1.6–2.6)
MCHC RBC-ENTMCNC: 23.3 PG — LOW (ref 27–34)
MCHC RBC-ENTMCNC: 29.9 GM/DL — LOW (ref 32–36)
MCV RBC AUTO: 77.9 FL — LOW (ref 80–100)
MONOCYTES # BLD AUTO: 0.96 K/UL — HIGH (ref 0–0.9)
MONOCYTES NFR BLD AUTO: 7.7 % — SIGNIFICANT CHANGE UP (ref 2–14)
NEUTROPHILS # BLD AUTO: 10.11 K/UL — HIGH (ref 1.8–7.4)
NEUTROPHILS NFR BLD AUTO: 81.1 % — HIGH (ref 43–77)
PHOSPHATE SERPL-MCNC: 3.1 MG/DL — SIGNIFICANT CHANGE UP (ref 2.4–4.7)
PLATELET # BLD AUTO: 240 K/UL — SIGNIFICANT CHANGE UP (ref 150–400)
POTASSIUM SERPL-MCNC: 3.3 MMOL/L — LOW (ref 3.5–5.3)
POTASSIUM SERPL-SCNC: 3.3 MMOL/L — LOW (ref 3.5–5.3)
PROT SERPL-MCNC: 6.5 G/DL — LOW (ref 6.6–8.7)
RBC # BLD: 4.12 M/UL — SIGNIFICANT CHANGE UP (ref 3.8–5.2)
RBC # FLD: 18.5 % — HIGH (ref 10.3–14.5)
SODIUM SERPL-SCNC: 137 MMOL/L — SIGNIFICANT CHANGE UP (ref 135–145)
WBC # BLD: 12.47 K/UL — HIGH (ref 3.8–10.5)
WBC # FLD AUTO: 12.47 K/UL — HIGH (ref 3.8–10.5)

## 2022-11-19 PROCEDURE — U0005: CPT

## 2022-11-19 PROCEDURE — 85730 THROMBOPLASTIN TIME PARTIAL: CPT

## 2022-11-19 PROCEDURE — 80053 COMPREHEN METABOLIC PANEL: CPT

## 2022-11-19 PROCEDURE — 76705 ECHO EXAM OF ABDOMEN: CPT

## 2022-11-19 PROCEDURE — 99285 EMERGENCY DEPT VISIT HI MDM: CPT

## 2022-11-19 PROCEDURE — 84702 CHORIONIC GONADOTROPIN TEST: CPT

## 2022-11-19 PROCEDURE — 85610 PROTHROMBIN TIME: CPT

## 2022-11-19 PROCEDURE — 85025 COMPLETE CBC W/AUTO DIFF WBC: CPT

## 2022-11-19 PROCEDURE — 74177 CT ABD & PELVIS W/CONTRAST: CPT | Mod: MA

## 2022-11-19 PROCEDURE — C1889: CPT

## 2022-11-19 PROCEDURE — C9399: CPT

## 2022-11-19 PROCEDURE — 88304 TISSUE EXAM BY PATHOLOGIST: CPT

## 2022-11-19 PROCEDURE — 36415 COLL VENOUS BLD VENIPUNCTURE: CPT

## 2022-11-19 PROCEDURE — U0003: CPT

## 2022-11-19 PROCEDURE — 82962 GLUCOSE BLOOD TEST: CPT

## 2022-11-19 PROCEDURE — 96375 TX/PRO/DX INJ NEW DRUG ADDON: CPT

## 2022-11-19 PROCEDURE — 84100 ASSAY OF PHOSPHORUS: CPT

## 2022-11-19 PROCEDURE — 96374 THER/PROPH/DIAG INJ IV PUSH: CPT

## 2022-11-19 PROCEDURE — 99024 POSTOP FOLLOW-UP VISIT: CPT | Mod: GC

## 2022-11-19 PROCEDURE — 96376 TX/PRO/DX INJ SAME DRUG ADON: CPT

## 2022-11-19 PROCEDURE — 83690 ASSAY OF LIPASE: CPT

## 2022-11-19 PROCEDURE — 83735 ASSAY OF MAGNESIUM: CPT

## 2022-11-19 RX ORDER — METOCLOPRAMIDE HCL 10 MG
10 TABLET ORAL EVERY 8 HOURS
Refills: 0 | Status: DISCONTINUED | OUTPATIENT
Start: 2022-11-19 | End: 2022-11-19

## 2022-11-19 RX ADMIN — ONDANSETRON 4 MILLIGRAM(S): 8 TABLET, FILM COATED ORAL at 05:24

## 2022-11-19 RX ADMIN — Medication 400 MILLIGRAM(S): at 05:24

## 2022-11-19 RX ADMIN — PANTOPRAZOLE SODIUM 40 MILLIGRAM(S): 20 TABLET, DELAYED RELEASE ORAL at 05:24

## 2022-11-19 RX ADMIN — Medication 400 MILLIGRAM(S): at 00:33

## 2022-11-19 RX ADMIN — Medication 400 MILLIGRAM(S): at 11:09

## 2022-11-19 RX ADMIN — ONDANSETRON 4 MILLIGRAM(S): 8 TABLET, FILM COATED ORAL at 01:19

## 2022-11-19 RX ADMIN — Medication 1000 MILLIGRAM(S): at 01:08

## 2022-11-19 RX ADMIN — TRAMADOL HYDROCHLORIDE 50 MILLIGRAM(S): 50 TABLET ORAL at 05:23

## 2022-11-19 NOTE — PROGRESS NOTE ADULT - ATTENDING COMMENTS
Agree with above assessment.  The patient was seen and examined by myself with the surgical PA and resident.   The patient is without abdominal pain, nausea, or vomit.  Abdomen is soft, non tender, incisions are clean without infection.  Stable for discharge home.
Patient is a 32yoF with PMH obesity who presents to the ED with epigastric/RUQ abdominal pain that began at approx 5am, associate with 5 episodes of NBNB emesis/retching and 1 day of diarrhea, admitted with acute cholecystitis  Awake alert  Abdomen soft and mildly tender, no RG    Plan:  -booked for lap vincenzo 11/18  -NPO/IVF  -IV abx  -pain control  -strict Is/Os  -continue home meds  -trend labs, replete electrolytes as needed  -encourage OOB  -incentive spirometry  -DVT ppx: SCDs, Lovenox 40 daily

## 2022-11-19 NOTE — DISCHARGE NOTE NURSING/CASE MANAGEMENT/SOCIAL WORK - NSDCPEFALRISK_GEN_ALL_CORE
For information on Fall & Injury Prevention, visit: [email or web address] OR  [email or web address] OR  https://www.cdc.gov/steadi/patient.html

## 2022-11-19 NOTE — DISCHARGE NOTE NURSING/CASE MANAGEMENT/SOCIAL WORK - PATIENT PORTAL LINK FT
You can access the FollowMyHealth Patient Portal offered by [facility] by registering at the following website: [email or web address]. By joining [employer]’s FollowMyHealth portal, you will also be able to view your health information using other applications (apps) compatible with our system.

## 2022-11-19 NOTE — PROGRESS NOTE ADULT - SUBJECTIVE AND OBJECTIVE BOX
Subjective:  Overnight the patient reports experiencing pain that was resolved with pain medication and the patient slept well throughout the rest of the night. The patient pain is well controlled on current pain regimen.  The patient does not report fever, chills, nausea vomiting, chest pain, and shortness of breath. The patient does not report any other acute complaints.     STATUS POST:  lap vincenzo  POD 1    MEDICATIONS  (STANDING):  acetaminophen   IVPB .. 1000 milliGRAM(s) IV Intermittent every 6 hours  enoxaparin Injectable 40 milliGRAM(s) SubCutaneous every 24 hours  multivitamin 1 Tablet(s) Oral daily  ondansetron Injectable 4 milliGRAM(s) IV Push every 4 hours  pantoprazole    Tablet 40 milliGRAM(s) Oral before breakfast    MEDICATIONS  (PRN):  traMADol 25 milliGRAM(s) Oral every 6 hours PRN Moderate Pain (4 - 6)  traMADol 50 milliGRAM(s) Oral every 6 hours PRN Severe Pain (7 - 10)      Vital Signs Last 24 Hrs  T(C): 36.7 (19 Nov 2022 00:00), Max: 37.2 (18 Nov 2022 20:30)  T(F): 98 (19 Nov 2022 00:00), Max: 98.9 (18 Nov 2022 20:30)  HR: 63 (19 Nov 2022 00:00) (56 - 78)  BP: 130/58 (19 Nov 2022 00:00) (95/53 - 149/86)  BP(mean): 82 (19 Nov 2022 00:00) (79 - 97)  RR: 18 (19 Nov 2022 00:00) (9 - 20)  SpO2: 96% (19 Nov 2022 00:00) (95% - 100%)    Parameters below as of 19 Nov 2022 00:00  Patient On (Oxygen Delivery Method): room air            Physical Exam:    Constitutional: NAD, lying bed   HEENT: atraumatic head, without nasal canula   Neck: Trachea midline, no injuries noted   Respiratory: Respirations non-labored and equal chest rises.   Cardiovascular: Regular rate & rhythm  Gastrointestinal: appropriated tender, surgical site are clean, dry, and intact  Extremities: moving all limbs, with palpable pulses.   Neurological: A&O x 3; without gross deficit      LABS:                        8.4    3.80  )-----------( 207      ( 18 Nov 2022 07:19 )             29.1     11-18    139  |  105  |  11.9  ----------------------------<  86  3.9   |  25.0  |  0.68    Ca    8.3<L>      18 Nov 2022 07:19  Phos  3.8     11-18  Mg     2.0     11-18    TPro  5.7<L>  /  Alb  3.1<L>  /  TBili  0.6  /  DBili  x   /  AST  440<H>  /  ALT  247<H>  /  AlkPhos  143<H>  11-18    PT/INR - ( 18 Nov 2022 08:30 )   PT: 12.6 sec;   INR: 1.09 ratio         PTT - ( 18 Nov 2022 08:30 )  PTT:30.0 sec      Assessment:  Valentino 32F s/p alexis vincenzo on 11/18 that stayed overnight due to pain control.     Plan:  - Continue regular diet  - abdominal exam and surgical site examination   - pain control  - discharge today. 
Patient seen and examined at bedside. Abdominal pain has improved, has not had further episodes of emesis    Vitals:  Vital Signs Last 24 Hrs  T(C): 36.9 (17 Nov 2022 23:12), Max: 37.2 (17 Nov 2022 19:39)  T(F): 98.5 (17 Nov 2022 23:12), Max: 98.9 (17 Nov 2022 19:39)  HR: 58 (17 Nov 2022 23:12) (58 - 88)  BP: 110/58 (17 Nov 2022 23:12) (93/58 - 116/63)  BP(mean): --  RR: 18 (17 Nov 2022 23:12) (16 - 18)  SpO2: 98% (17 Nov 2022 23:12) (98% - 100%)    Parameters below as of 17 Nov 2022 23:12  Patient On (Oxygen Delivery Method): room air    Labs:  11-17    140  |  105  |  10.4  ----------------------------<  127<H>  3.7   |  24.0  |  0.74    Ca    8.6      17 Nov 2022 06:22  Mg     1.9     11-17    TPro  6.4<L>  /  Alb  3.5  /  TBili  0.4  /  DBili  x   /  AST  43<H>  /  ALT  17  /  AlkPhos  78  11-17                            9.4    6.55  )-----------( 226      ( 17 Nov 2022 06:22 )             30.9     Exam:  Gen: pt lying in bed, alert, in NAD  Resp: unlabored  CVS: RRR  Abd: soft, ND, TTP RUQ, positive you's sign  Ext: moving all extremities spontaneously, sensation intact, pulses 2+

## 2022-12-05 LAB — SURGICAL PATHOLOGY STUDY: SIGNIFICANT CHANGE UP

## 2023-03-18 ENCOUNTER — EMERGENCY (EMERGENCY)
Facility: HOSPITAL | Age: 33
LOS: 1 days | Discharge: HOME | End: 2023-03-18
Attending: EMERGENCY MEDICINE
Payer: MEDICAID

## 2023-03-18 VITALS
TEMPERATURE: 98 F | SYSTOLIC BLOOD PRESSURE: 136 MMHG | HEIGHT: 64 IN | WEIGHT: 160.06 LBS | HEART RATE: 144 BPM | DIASTOLIC BLOOD PRESSURE: 78 MMHG | OXYGEN SATURATION: 97 % | RESPIRATION RATE: 18 BRPM

## 2023-03-18 VITALS — OXYGEN SATURATION: 98 % | RESPIRATION RATE: 20 BRPM | HEART RATE: 102 BPM

## 2023-03-18 DIAGNOSIS — Z98.891 HISTORY OF UTERINE SCAR FROM PREVIOUS SURGERY: Chronic | ICD-10-CM

## 2023-03-18 DIAGNOSIS — Z98.890 OTHER SPECIFIED POSTPROCEDURAL STATES: Chronic | ICD-10-CM

## 2023-03-18 DIAGNOSIS — Z98.84 BARIATRIC SURGERY STATUS: Chronic | ICD-10-CM

## 2023-03-18 PROCEDURE — 99283 EMERGENCY DEPT VISIT LOW MDM: CPT

## 2023-03-18 PROCEDURE — 99284 EMERGENCY DEPT VISIT MOD MDM: CPT

## 2023-03-18 PROCEDURE — 82962 GLUCOSE BLOOD TEST: CPT

## 2023-03-18 NOTE — ED ADULT TRIAGE NOTE - INTERNATIONAL TRAVEL
Problem: Oxygenation:  Goal: Maintain adequate oxygenation dependent on patient condition  Intervention: Levels of oxygenation will improve to baseline  Pt remained on 1L nasal cannula throughout sift with Sats 94-98%           No

## 2023-03-18 NOTE — ED PROVIDER NOTE - PATIENT PORTAL LINK FT
You can access the FollowMyHealth Patient Portal offered by [facility] by registering at the following website: [email or web address]. By joining [facility]’s FollowMyHealth portal, you will also be able to view your health information using other applications (apps) compatible with our system.
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
## 2023-04-19 NOTE — ED ADULT TRIAGE NOTE - SPO2 (%)
98 Information from patient questionnaire entered by Chapis Valenzuela.





I have reviewed and concur with the information entered by Chapis Valenzuela. This 

document represents the service I personally performed and the decisions made by

me, Ellen Contreras ARNP.





History of Present Illness


Service Date and Time: 04/19/2023    1010


Initial Troy Sleepiness Scale score: 13 (03/22/23)


Current Troy Sleepiness Scale score: 14 (04/19/23)


Additional HPI information: 





KE SHAW returns for follow up and results of the recently performed 

polysomnography.





I explained the pathophysiology behind obstructive sleep apnea. We then spent 

quite a bit of time discussing different treatment options.  For mild 

obstructive sleep apnea, surgery and oral appliance are alternatives to nasal 

CPAP therapy but in moderate or severe cases, nasal CPAP is the most effective 

and reliable treatment.   





Because apnea is primarily in supine position, then positional management 

therapy could be effective. Methods discussed such as positioning with pillows, 

using a T-shirt with tennis balls in the back or commercial products that have a

pillow format on back to prevent supine sleep. I reviewed the impact of weight 

changes on sleep apnea and strongly recommended losing weight.  After some 

discussion, the patient opted to go with the nasal CPAP therapy.  Nasal autoCPAP

set at 4-15 cmH20 will be ordered with rationale explained. A manual titration 

study will be ordered if unable to find optimal pressure with office 

adjustments. 





I explained how CPAP machine works and what to expect when using the machine.  

Using CPAP every night in order to get used to it was emphasized.  Patient 

advised to put CPAP mask on before getting into bed so as not to fall asleep 

without CPAP.  To assist acclimation to CPAP use, it could also be used for a 

short time during day while reading or watching TV. The patient was instructed 

to call the CPAP supplier to discuss any mechanical problem that may occur. If 

the mask given is uncomfortable or is difficult to keep on through the night 

even with adjustment, contact the CPAP supplier as many will replace with 

another mask style if notified before  30 days.  If snoring or perceives is not 

getting enough air or too much air from the machine, notify this office.  

Patient does not drink alcohol.  Patient was cautioned about risks of drowsy 

driving until sleepiness symptoms resolve. Patient denies drowsy driving. 





Sleep Study





- Results


Type of Sleep Study: Polysomnography (COMPLETED 04/11/23)


Prior sleep studies: No


Polysomnography/Home Sleep Study results: 





IMPRESSION: The quality of the study is good. The patient had reduced sleep 

efficiency due to prolonged


awakening in the middle of the night. The sleep architecture was abnormal for 

sleep fragmentation and reduced


amount of time spent in REM and slow wave sleep (N3). Respiratory monitoring 

showed moderate obstructive sleep


apnea-hypopnea (AHI = 25.0) associated with frequent arousals, oxyhemoglobin 

desaturation and mild hypoxia


(afsaneh oxygen saturation of 85%). The respiratory events occurred almost 

exclusively during supine sleep (supine


AHI = 57.7; non-supine = 4.14). Snore was moderate to loud in intensity. There 

was mild periodic leg movement


of sleep not contributing to the sleep fragmentation. Cardiac rhythm was normal 

sinus rhythm without significant


arrhythmia. No abnormal behavior (parasomnia) observed during the night.





Allergies and Home Medications


Known drug allergies: No


Drug allergies reviewed: Yes


Home medication list reviewed: Yes (no changes)


Allergy and home medication list: 


Allergies





No Known Drug Allergies Allergy (Verified 04/19/23 08:25)





Review of Systems


Review of systems same as previous: Yes (no changes)





Physical Exam


Vital signs obtained and entered by: CHAPIS LAZAR MA


Blood Pressure: 116/74 (LEFT ARM)


Cuff size: regular


Heart Rate: 87


O2 Saturation: 97


Height: 5 ft 9 in


Weight: 211 lb 9.6 oz


Body Mass Index: 31.2


BMI Classification: Obese





Impression and Plan





1. Obstructive Sleep Apnea-Hypopnea Syndrome, moderate, with lowest oxygen 

saturation of 85%. Obviously this is the cause of the patients symptoms of 

unrefreshed sleep, and excessive daytime sleepiness. Positive pressure therapy 

could benefit anxiety, depression and attention deficit.  As mentioned above, 

the patient will be started on nasal autoCPAP therapy with pressure set at 4-15 

cmH2O. A manual titration study will be completed if unable to find optimal 

treatment pressure with office adjustments. Compliance guidelines also reviewed.

A copy of compliance guidelines will be given for reference at check out. 

Because the apnea is more severe supine, I instructed to avoid sleeping supine 

using pillow positioning until able to start CPAP use. 





2. Periodic limb movement, mild, that did not fragment patients sleep. Periodic

limb movement of sleep (PLMS) is characterized by episodes of repetitive limb 

movements that occur during sleep and usually involve the lower limbs. The 

etiology is unknown. Caffeine can aggravate PLMS and should be avoided. Sleep 

hygiene methods can also improve sleep as well as lifestyle changes such as 

regular exercise. Patient was advised that no treatment is needed at this time. 

If symptoms increase, then further evaluation is indicated.





* Nasal auto CPAP therapy, pressure at 4-15 cm H2O.


* Attempt to lose weight.


* Avoid alcohol consumption near bedtime.


* Avoid supine sleep until using CPAP.


* The patient is again cautioned about driving until sleepiness completely 

  resolves.


* Return one month after CPAP obtained. I will assess response to therapy and 

  compliance at that time.





Counseling Topics: Sleeping position, Weight loss health impact


Visit Type: In Office


Time Spent with Patient (minutes): 21


Provider Statement: I spent 100% of the Face to Face Visit with the patient with

greater than 50% spent counseling the patient and coordination of care.

## 2023-06-02 NOTE — PATIENT PROFILE ADULT - NSPROEXTENSIONSOFSELF_GEN_A_NUR
Chest Pain Protocol    RRT called to bedside at 2325 for a CPAP.     Per Mr. Umana, Patient had chest pain that started around 2000 at which time he was given Norco. He describes the pain as 2/10 pressure that is mid-sternal near his incision. Pain is reproducible with palpation and movement> This pain does not radiate. Patient briefly flipped into afib at rates as high as 140's at this time.     Vital signs are stable.  HR: 120  BP: 124/92  SPO2: 98%  RR: 18    EKG obtained at 2331 per protocol. EKG was read by Dr. Trejo, and no STEMI was noted. Primary MD was notified of CPAP by primary RN for further management of pain.     As per protocol, RRT team member will follow-up with patient in 12 hours.      none

## 2023-09-10 NOTE — BRIEF OPERATIVE NOTE - NSICDXBRIEFPROCEDURE_GEN_ALL_CORE_FT
No
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

## 2023-09-28 ENCOUNTER — EMERGENCY (EMERGENCY)
Facility: HOSPITAL | Age: 33
LOS: 1 days | Discharge: HOME | End: 2023-09-28
Attending: EMERGENCY MEDICINE
Payer: MEDICAID

## 2023-09-28 VITALS
WEIGHT: 169.98 LBS | OXYGEN SATURATION: 96 % | TEMPERATURE: 98 F | SYSTOLIC BLOOD PRESSURE: 127 MMHG | RESPIRATION RATE: 18 BRPM | HEART RATE: 112 BPM | HEIGHT: 64 IN | DIASTOLIC BLOOD PRESSURE: 76 MMHG

## 2023-09-28 DIAGNOSIS — Z98.891 HISTORY OF UTERINE SCAR FROM PREVIOUS SURGERY: Chronic | ICD-10-CM

## 2023-09-28 DIAGNOSIS — Z98.890 OTHER SPECIFIED POSTPROCEDURAL STATES: Chronic | ICD-10-CM

## 2023-09-28 DIAGNOSIS — Z98.84 BARIATRIC SURGERY STATUS: Chronic | ICD-10-CM

## 2023-09-28 PROCEDURE — 99284 EMERGENCY DEPT VISIT MOD MDM: CPT

## 2023-09-28 PROCEDURE — 96372 THER/PROPH/DIAG INJ SC/IM: CPT

## 2023-09-28 PROCEDURE — 99283 EMERGENCY DEPT VISIT LOW MDM: CPT | Mod: 25

## 2023-09-28 PROCEDURE — 73030 X-RAY EXAM OF SHOULDER: CPT | Mod: 26,RT

## 2023-09-28 PROCEDURE — 73030 X-RAY EXAM OF SHOULDER: CPT

## 2023-09-28 RX ORDER — CYCLOBENZAPRINE HYDROCHLORIDE 10 MG/1
1 TABLET, FILM COATED ORAL
Qty: 9 | Refills: 0
Start: 2023-09-28 | End: 2023-09-30

## 2023-09-28 RX ORDER — IBUPROFEN 200 MG
1 TABLET ORAL
Qty: 28 | Refills: 0
Start: 2023-09-28 | End: 2023-10-04

## 2023-09-28 RX ORDER — LIDOCAINE 4 G/100G
1 CREAM TOPICAL ONCE
Refills: 0 | Status: COMPLETED | OUTPATIENT
Start: 2023-09-28 | End: 2023-09-28

## 2023-09-28 RX ORDER — CYCLOBENZAPRINE HYDROCHLORIDE 10 MG/1
10 TABLET, FILM COATED ORAL ONCE
Refills: 0 | Status: COMPLETED | OUTPATIENT
Start: 2023-09-28 | End: 2023-09-28

## 2023-09-28 RX ORDER — DEXAMETHASONE 0.5 MG/5ML
10 ELIXIR ORAL ONCE
Refills: 0 | Status: COMPLETED | OUTPATIENT
Start: 2023-09-28 | End: 2023-09-28

## 2023-09-28 RX ORDER — KETOROLAC TROMETHAMINE 30 MG/ML
15 SYRINGE (ML) INJECTION ONCE
Refills: 0 | Status: DISCONTINUED | OUTPATIENT
Start: 2023-09-28 | End: 2023-09-28

## 2023-09-28 RX ADMIN — Medication 15 MILLIGRAM(S): at 04:00

## 2023-09-28 RX ADMIN — Medication 10 MILLIGRAM(S): at 04:01

## 2023-09-28 RX ADMIN — CYCLOBENZAPRINE HYDROCHLORIDE 10 MILLIGRAM(S): 10 TABLET, FILM COATED ORAL at 04:01

## 2023-09-28 RX ADMIN — LIDOCAINE 1 PATCH: 4 CREAM TOPICAL at 04:06

## 2023-09-28 NOTE — ED ADULT NURSE NOTE - NSFALLUNIVINTERV_ED_ALL_ED
Bed/Stretcher in lowest position, wheels locked, appropriate side rails in place/Call bell, personal items and telephone in reach/Instruct patient to call for assistance before getting out of bed/chair/stretcher/Non-slip footwear applied when patient is off stretcher/[place] to call system/Physically safe environment - no spills, clutter or unnecessary equipment/Purposeful proactive rounding/Room/bathroom lighting operational, light cord in reach

## 2023-09-28 NOTE — ED PROVIDER NOTE - CLINICAL SUMMARY MEDICAL DECISION MAKING FREE TEXT BOX
33y female present to ED for right arm pain. Pt reports waking up Tuesday morning with some right shoulder pain with radiation to hand. pt states she went to work, pain got worse. pt states pain radiates to hand and makes her fingers tingle. pt admits taking 400mg IBU at 10pm yesterday. Denies trauma, sleeping on arm, numbness, coldness, n/v/d, abd pain, CP SOB.  Xray, pain control, re-assess

## 2023-09-28 NOTE — ED ADULT NURSE NOTE - OBJECTIVE STATEMENT
pt a&ox4, rr even and unlabored on room air, no acute distress noted. pt states she woke up in excruciating pain this morning starting from her upper right shoulder radiating down to her fingers. pt denies N/V/D, tingling or numbness. pt states from elbow up she feels a burning sensation on the right arm. pt able to extend all fingers on right hand, unable to make a tight fist. pt ambulates independently. pt safety maintained, family at bedside.

## 2023-09-28 NOTE — ED PROVIDER NOTE - ATTENDING APP SHARED VISIT CONTRIBUTION OF CARE
33y female present to ED for right arm pain. Pt reports waking up Tuesday morning with some right shoulder pain radiating down to her hand. pt states she went to work, pain got worse. pt states pain radiates to hand and makes her fingers tingle. pt admits taking 400mg IBU at 10pm yesterday. pain worse with movement.  Denies trauma, sleeping on arm, numbness, coldness/. Denies f/c/n/v/cp/sob/palpitations/ cough/rash/headache/dizziness/abd.pain/d/c/dysuria/hematuria    ue: NEUROVASCULALRY INTACT ttp to right deltoid/shoulder area from to elbow wrist hand no erythema no increased warmth    --most likely calcific tendonitis will check xray pain control reassess

## 2023-09-28 NOTE — ED PROVIDER NOTE - NSFOLLOWUPINSTRUCTIONS_ED_ALL_ED_FT
- Follow up with orthopedics  - take medication as directed        SEEK IMMEDIATE MEDICAL CARE IF YOU HAVE ANY OF THE FOLLOWING SYMPTOMS: worsening pain, inability to move that body part, numbness or tingling.

## 2023-09-28 NOTE — ED PROVIDER NOTE - PHYSICAL EXAMINATION
Gen: No acute distress, non toxic  HEENT: Mucous membranes moist, pink conjunctivae, EOMI. PERRL. Airway patent.   CV: RRR, nl s1/s2.  Resp: CTAB, normal rate and effort. No wheezes, rhonchi, or crackles.   GI: Abdomen soft, NT, ND. No rebound, no guarding  Neuro: A&O x4, MAEx4. 5/5 str ext x 4. Sensation intact, symmetric throughout. No FND's. Gait intact.   MSK: +right anterior GH TTP. No midline spinal ttp. No visualized or palpable deformities.  Skin: No rashes, skin intact and well perfused. Cap refill <2sec  Vascular: Radial and dorsalis pedal pulses 2+ b/l

## 2023-09-28 NOTE — ED PROVIDER NOTE - WR INTERPRETATION 1
Patient discharged with v/s stable. Written and verbal after care instructions 
given and explained. 

Patient alert, oriented and verbalized understanding of instructions. 
Ambulatory with steady gait. All questions addressed prior to discharge. ID 
band removed. Patient advised to follow up with PMD. Rx given. Patient educated 
on indication of medication including possible reaction and side effects. 
Opportunity to ask questions provided and answered. +calcified tendon

## 2023-09-28 NOTE — ED PROVIDER NOTE - CARE PROVIDER_API CALL
Audie Pepe  Orthopaedic Surgery  [address]  [place] [address]  Phone: [phone]  Fax: [phone]  Follow Up Time:

## 2023-09-28 NOTE — ED PROVIDER NOTE - OBJECTIVE STATEMENT
33y female present to ED for right arm pain. Pt reports waking up Tuesday morning with some right shoulder pain with radiation to hand. pt states she went to work, pain got worse. pt states pain radiates to hand and makes her fingers tingle. pt admits taking 400mg IBU at 10pm yesterday. Denies trauma, sleeping on arm, numbness, coldness, n/v/d, abd pain, CP SOB.

## 2023-09-28 NOTE — ED ADULT TRIAGE NOTE - CHIEF COMPLAINT QUOTE
c/o of right neck, shoulder to arm pain. Report 3 fingers on the right hand feels numb since yesterday when she jumped out of bed to when her daughter called. took advil at 10 pm

## 2023-10-18 NOTE — ED PROVIDER NOTE - PATIENT PORTAL LINK FT
How Severe Are They?: severe
Is This A New Presentation, Or A Follow-Up?: Skin Lesion
You can access the FollowMyHealth Patient Portal offered by [facility] by registering at the following website: [email or web address]. By joining [employer]’s FollowMyHealth portal, you will also be able to view your health information using other applications (apps) compatible with our system.

## 2024-03-06 NOTE — ED PROVIDER NOTE - WET READ LAUNCH FT
Pt  mother is calling Pt has runny nose and cough , asking for advise    There are no Wet Read(s) to document.

## 2024-03-17 ENCOUNTER — EMERGENCY (EMERGENCY)
Facility: HOSPITAL | Age: 34
LOS: 1 days | Discharge: HOME | End: 2024-03-17
Attending: EMERGENCY MEDICINE
Payer: MEDICAID

## 2024-03-17 VITALS
OXYGEN SATURATION: 98 % | TEMPERATURE: 99 F | RESPIRATION RATE: 18 BRPM | DIASTOLIC BLOOD PRESSURE: 62 MMHG | HEART RATE: 81 BPM | SYSTOLIC BLOOD PRESSURE: 98 MMHG

## 2024-03-17 VITALS
HEART RATE: 86 BPM | SYSTOLIC BLOOD PRESSURE: 119 MMHG | TEMPERATURE: 99 F | RESPIRATION RATE: 18 BRPM | OXYGEN SATURATION: 97 % | DIASTOLIC BLOOD PRESSURE: 79 MMHG

## 2024-03-17 DIAGNOSIS — Z98.891 HISTORY OF UTERINE SCAR FROM PREVIOUS SURGERY: Chronic | ICD-10-CM

## 2024-03-17 DIAGNOSIS — Z98.84 BARIATRIC SURGERY STATUS: Chronic | ICD-10-CM

## 2024-03-17 DIAGNOSIS — Z98.890 OTHER SPECIFIED POSTPROCEDURAL STATES: Chronic | ICD-10-CM

## 2024-03-17 PROCEDURE — 70450 CT HEAD/BRAIN W/O DYE: CPT | Mod: MC

## 2024-03-17 PROCEDURE — 99053 MED SERV 10PM-8AM 24 HR FAC: CPT

## 2024-03-17 PROCEDURE — 72125 CT NECK SPINE W/O DYE: CPT | Mod: MC

## 2024-03-17 PROCEDURE — 99284 EMERGENCY DEPT VISIT MOD MDM: CPT

## 2024-03-17 PROCEDURE — 99285 EMERGENCY DEPT VISIT HI MDM: CPT | Mod: 25

## 2024-03-17 PROCEDURE — 82962 GLUCOSE BLOOD TEST: CPT

## 2024-03-17 PROCEDURE — 70450 CT HEAD/BRAIN W/O DYE: CPT | Mod: 26,MC

## 2024-03-17 PROCEDURE — 72125 CT NECK SPINE W/O DYE: CPT | Mod: 26,MC

## 2024-03-17 RX ORDER — ACETAMINOPHEN 500 MG
975 TABLET ORAL ONCE
Refills: 0 | Status: COMPLETED | OUTPATIENT
Start: 2024-03-17 | End: 2024-03-17

## 2024-03-17 RX ADMIN — Medication 975 MILLIGRAM(S): at 08:16

## 2024-03-17 NOTE — ED ADULT NURSE NOTE - OBJECTIVE STATEMENT
Assumed care of pt at 0715. Pt A&Ox4 c/o a fall, the pt states that she was drinking last night when she fell, the pt states that she has a headache. the pt denies V/D/CP/SOB/LOC/blood thinners, pt in yellow gown, pt resting comfortably showing no signs of respiratory distress or pain, the pt is calm and cooperative

## 2024-03-17 NOTE — ED PROVIDER NOTE - PHYSICAL EXAMINATION
Gen: NAD, AOx3  Head: right forehead pain, occipital, cervical pain to palpation  HEENT: EOMI, oral mucosa moist, normal conjunctiva, neck supple  Lung: CTAB, no respiratory distress  CV: rrr, no murmur, Normal perfusion  Abd: soft, NTND  MSK: bilateral lower extremity bruising  Neuro: No focal neurologic deficits  Skin: No rash   Psych: normal affect

## 2024-03-17 NOTE — ED ADULT NURSE NOTE - CHIEF COMPLAINT QUOTE
pt is AOX4, states she drank etoh until 4am this morning.  she fell from standing height 1 hour ago and is now complaining of forehead pain and pain to back of her head.  denies nausea, dizziness, vomiting, numbness, weakness, tingling, blood thinner use.  pt changed into yellow gown, belongings secured and wanded.  fs in triage 99
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
## 2024-03-17 NOTE — ED PROVIDER NOTE - CLINICAL SUMMARY MEDICAL DECISION MAKING FREE TEXT BOX
33 year old female w/noPMHx presents to the ED for a fall. Vitals stable. Patient appears well, alert and oriented. Examination significant for b/l LE bruising. Lungs clear bilaterally. SW contacted for patient safety. CT head and neck ordered. Given tylenol for pain management, 33 year old female w/noPMHx presents to the ED for a fall. Vitals stable. Patient appears well, alert and oriented. Examination significant for b/l LE bruising. Lungs clear bilaterally. FRANCISCO contacted for patient safety. CT head and neck ordered. Given tylenol for pain management. FRANCISCO reports that patient feels safe to go back home, children living with her biological father - in a safe environment. Fit for discharge at this time.

## 2024-03-17 NOTE — ED PROVIDER NOTE - PATIENT PORTAL LINK FT
You can access the FollowMyHealth Patient Portal offered by [facility] by registering at the following website: [email or web address]. By joining [employer]’s FollowMyHealth portal, you will also be able to view your health information using other applications (apps) compatible with our system.

## 2024-03-17 NOTE — ED ADULT NURSE NOTE - NSFALLRISKINTERV_ED_ALL_ED
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
## 2024-03-17 NOTE — ED PROVIDER NOTE - PROGRESS NOTE DETAILS
Pt is awake, alert, oriented. Walking well without assistance. Speaking clearly. Pt is clinically sober and safe for discharge. Counseled on cessation of alcohol.  cleared by  for safe dc

## 2024-03-17 NOTE — ED ADULT TRIAGE NOTE - CHIEF COMPLAINT QUOTE
pt is AOX4, states she drank etoh until 4am this morning.  she fell from standing height and is now complaining of forehead pain and pain to back of her head.  denies nausea, dizziness, vomiting, numbness, weakness, tingling, blood thinner use.  pt changed into yellow gown, belongings secured and wanded pt is AOX4, states she drank etoh until 4am this morning.  she fell from standing height 1 hour ago and is now complaining of forehead pain and pain to back of her head.  denies nausea, dizziness, vomiting, numbness, weakness, tingling, blood thinner use.  pt changed into yellow gown, belongings secured and wanded.  fs in triage 99

## 2024-04-05 ENCOUNTER — EMERGENCY (EMERGENCY)
Facility: HOSPITAL | Age: 34
LOS: 1 days | Discharge: HOME | End: 2024-04-05
Attending: EMERGENCY MEDICINE
Payer: COMMERCIAL

## 2024-04-05 VITALS
OXYGEN SATURATION: 98 % | RESPIRATION RATE: 18 BRPM | SYSTOLIC BLOOD PRESSURE: 131 MMHG | HEART RATE: 95 BPM | DIASTOLIC BLOOD PRESSURE: 95 MMHG | TEMPERATURE: 97 F

## 2024-04-05 VITALS
HEART RATE: 69 BPM | SYSTOLIC BLOOD PRESSURE: 107 MMHG | TEMPERATURE: 98 F | RESPIRATION RATE: 18 BRPM | DIASTOLIC BLOOD PRESSURE: 81 MMHG | OXYGEN SATURATION: 98 %

## 2024-04-05 DIAGNOSIS — Z98.890 OTHER SPECIFIED POSTPROCEDURAL STATES: Chronic | ICD-10-CM

## 2024-04-05 DIAGNOSIS — F10.90 ALCOHOL USE, UNSPECIFIED, UNCOMPLICATED: ICD-10-CM

## 2024-04-05 DIAGNOSIS — Z98.84 BARIATRIC SURGERY STATUS: Chronic | ICD-10-CM

## 2024-04-05 DIAGNOSIS — Z98.891 HISTORY OF UTERINE SCAR FROM PREVIOUS SURGERY: Chronic | ICD-10-CM

## 2024-04-05 DIAGNOSIS — R45.89 OTHER SYMPTOMS AND SIGNS INVOLVING EMOTIONAL STATE: ICD-10-CM

## 2024-04-05 LAB
ALBUMIN SERPL ELPH-MCNC: 4.1 G/DL — SIGNIFICANT CHANGE UP (ref 3.3–5.2)
ALP SERPL-CCNC: 105 U/L — SIGNIFICANT CHANGE UP (ref 40–120)
ALT FLD-CCNC: 16 U/L — SIGNIFICANT CHANGE UP
AMPHET UR-MCNC: NEGATIVE — SIGNIFICANT CHANGE UP
ANION GAP SERPL CALC-SCNC: 13 MMOL/L — SIGNIFICANT CHANGE UP (ref 5–17)
ANISOCYTOSIS BLD QL: SIGNIFICANT CHANGE UP
APAP SERPL-MCNC: <3 UG/ML — LOW (ref 10–26)
APPEARANCE UR: CLEAR — SIGNIFICANT CHANGE UP
AST SERPL-CCNC: 30 U/L — SIGNIFICANT CHANGE UP
BARBITURATES UR SCN-MCNC: NEGATIVE — SIGNIFICANT CHANGE UP
BASOPHILS # BLD AUTO: 0.12 K/UL — SIGNIFICANT CHANGE UP (ref 0–0.2)
BASOPHILS NFR BLD AUTO: 2.6 % — HIGH (ref 0–2)
BENZODIAZ UR-MCNC: POSITIVE
BILIRUB SERPL-MCNC: 0.3 MG/DL — LOW (ref 0.4–2)
BILIRUB UR-MCNC: NEGATIVE — SIGNIFICANT CHANGE UP
BUN SERPL-MCNC: 7.4 MG/DL — LOW (ref 8–20)
CALCIUM SERPL-MCNC: 8.5 MG/DL — SIGNIFICANT CHANGE UP (ref 8.4–10.5)
CHLORIDE SERPL-SCNC: 104 MMOL/L — SIGNIFICANT CHANGE UP (ref 96–108)
CO2 SERPL-SCNC: 25 MMOL/L — SIGNIFICANT CHANGE UP (ref 22–29)
COCAINE METAB.OTHER UR-MCNC: NEGATIVE — SIGNIFICANT CHANGE UP
COLOR SPEC: YELLOW — SIGNIFICANT CHANGE UP
CREAT SERPL-MCNC: 0.57 MG/DL — SIGNIFICANT CHANGE UP (ref 0.5–1.3)
DIFF PNL FLD: NEGATIVE — SIGNIFICANT CHANGE UP
EGFR: 125 ML/MIN/1.73M2 — SIGNIFICANT CHANGE UP
EOSINOPHIL # BLD AUTO: 0 K/UL — SIGNIFICANT CHANGE UP (ref 0–0.5)
EOSINOPHIL NFR BLD AUTO: 0 % — SIGNIFICANT CHANGE UP (ref 0–6)
ETHANOL SERPL-MCNC: 260 MG/DL — HIGH (ref 0–9)
GIANT PLATELETS BLD QL SMEAR: PRESENT — SIGNIFICANT CHANGE UP
GLUCOSE SERPL-MCNC: 97 MG/DL — SIGNIFICANT CHANGE UP (ref 70–99)
GLUCOSE UR QL: NEGATIVE MG/DL — SIGNIFICANT CHANGE UP
HCG SERPL-ACNC: <4 MIU/ML — SIGNIFICANT CHANGE UP
HCT VFR BLD CALC: 30.4 % — LOW (ref 34.5–45)
HGB BLD-MCNC: 9.3 G/DL — LOW (ref 11.5–15.5)
KETONES UR-MCNC: NEGATIVE MG/DL — SIGNIFICANT CHANGE UP
LEUKOCYTE ESTERASE UR-ACNC: NEGATIVE — SIGNIFICANT CHANGE UP
LYMPHOCYTES # BLD AUTO: 1.33 K/UL — SIGNIFICANT CHANGE UP (ref 1–3.3)
LYMPHOCYTES # BLD AUTO: 29.6 % — SIGNIFICANT CHANGE UP (ref 13–44)
MANUAL SMEAR VERIFICATION: SIGNIFICANT CHANGE UP
MCHC RBC-ENTMCNC: 22.1 PG — LOW (ref 27–34)
MCHC RBC-ENTMCNC: 30.6 GM/DL — LOW (ref 32–36)
MCV RBC AUTO: 72.2 FL — LOW (ref 80–100)
METHADONE UR-MCNC: NEGATIVE — SIGNIFICANT CHANGE UP
MICROCYTES BLD QL: SLIGHT — SIGNIFICANT CHANGE UP
MONOCYTES # BLD AUTO: 0.32 K/UL — SIGNIFICANT CHANGE UP (ref 0–0.9)
MONOCYTES NFR BLD AUTO: 7 % — SIGNIFICANT CHANGE UP (ref 2–14)
NEUTROPHILS # BLD AUTO: 2.67 K/UL — SIGNIFICANT CHANGE UP (ref 1.8–7.4)
NEUTROPHILS NFR BLD AUTO: 59.1 % — SIGNIFICANT CHANGE UP (ref 43–77)
NITRITE UR-MCNC: NEGATIVE — SIGNIFICANT CHANGE UP
OPIATES UR-MCNC: NEGATIVE — SIGNIFICANT CHANGE UP
OVALOCYTES BLD QL SMEAR: SLIGHT — SIGNIFICANT CHANGE UP
PCP SPEC-MCNC: SIGNIFICANT CHANGE UP
PCP UR-MCNC: NEGATIVE — SIGNIFICANT CHANGE UP
PH UR: 6 — SIGNIFICANT CHANGE UP (ref 5–8)
PLAT MORPH BLD: NORMAL — SIGNIFICANT CHANGE UP
PLATELET # BLD AUTO: 262 K/UL — SIGNIFICANT CHANGE UP (ref 150–400)
POIKILOCYTOSIS BLD QL AUTO: SLIGHT — SIGNIFICANT CHANGE UP
POLYCHROMASIA BLD QL SMEAR: SLIGHT — SIGNIFICANT CHANGE UP
POTASSIUM SERPL-MCNC: 3.3 MMOL/L — LOW (ref 3.5–5.3)
POTASSIUM SERPL-SCNC: 3.3 MMOL/L — LOW (ref 3.5–5.3)
PROT SERPL-MCNC: 7.4 G/DL — SIGNIFICANT CHANGE UP (ref 6.6–8.7)
PROT UR-MCNC: NEGATIVE MG/DL — SIGNIFICANT CHANGE UP
RBC # BLD: 4.21 M/UL — SIGNIFICANT CHANGE UP (ref 3.8–5.2)
RBC # FLD: 20.1 % — HIGH (ref 10.3–14.5)
RBC BLD AUTO: ABNORMAL
SALICYLATES SERPL-MCNC: <0.6 MG/DL — LOW (ref 10–20)
SARS-COV-2 RNA SPEC QL NAA+PROBE: SIGNIFICANT CHANGE UP
SODIUM SERPL-SCNC: 142 MMOL/L — SIGNIFICANT CHANGE UP (ref 135–145)
SP GR SPEC: 1.02 — SIGNIFICANT CHANGE UP (ref 1–1.03)
STOMATOCYTES BLD QL SMEAR: SLIGHT — SIGNIFICANT CHANGE UP
THC UR QL: POSITIVE
UROBILINOGEN FLD QL: 1 MG/DL — SIGNIFICANT CHANGE UP (ref 0.2–1)
VARIANT LYMPHS # BLD: 1.7 % — SIGNIFICANT CHANGE UP (ref 0–6)
WBC # BLD: 4.51 K/UL — SIGNIFICANT CHANGE UP (ref 3.8–10.5)
WBC # FLD AUTO: 4.51 K/UL — SIGNIFICANT CHANGE UP (ref 3.8–10.5)

## 2024-04-05 PROCEDURE — 36415 COLL VENOUS BLD VENIPUNCTURE: CPT

## 2024-04-05 PROCEDURE — 96376 TX/PRO/DX INJ SAME DRUG ADON: CPT

## 2024-04-05 PROCEDURE — 81003 URINALYSIS AUTO W/O SCOPE: CPT

## 2024-04-05 PROCEDURE — 84702 CHORIONIC GONADOTROPIN TEST: CPT

## 2024-04-05 PROCEDURE — 85025 COMPLETE CBC W/AUTO DIFF WBC: CPT

## 2024-04-05 PROCEDURE — 96374 THER/PROPH/DIAG INJ IV PUSH: CPT

## 2024-04-05 PROCEDURE — 80307 DRUG TEST PRSMV CHEM ANLYZR: CPT

## 2024-04-05 PROCEDURE — 93005 ELECTROCARDIOGRAM TRACING: CPT

## 2024-04-05 PROCEDURE — 99285 EMERGENCY DEPT VISIT HI MDM: CPT | Mod: 25

## 2024-04-05 PROCEDURE — 90792 PSYCH DIAG EVAL W/MED SRVCS: CPT

## 2024-04-05 PROCEDURE — 93010 ELECTROCARDIOGRAM REPORT: CPT

## 2024-04-05 PROCEDURE — 87635 SARS-COV-2 COVID-19 AMP PRB: CPT

## 2024-04-05 PROCEDURE — 80053 COMPREHEN METABOLIC PANEL: CPT

## 2024-04-05 RX ORDER — POTASSIUM CHLORIDE 20 MEQ
40 PACKET (EA) ORAL ONCE
Refills: 0 | Status: COMPLETED | OUTPATIENT
Start: 2024-04-05 | End: 2024-04-05

## 2024-04-05 RX ORDER — DIPHENHYDRAMINE HCL 50 MG
25 CAPSULE ORAL ONCE
Refills: 0 | Status: COMPLETED | OUTPATIENT
Start: 2024-04-05 | End: 2024-04-05

## 2024-04-05 RX ORDER — KETAMINE HYDROCHLORIDE 100 MG/ML
100 INJECTION INTRAMUSCULAR; INTRAVENOUS ONCE
Refills: 0 | Status: DISCONTINUED | OUTPATIENT
Start: 2024-04-05 | End: 2024-04-05

## 2024-04-05 RX ADMIN — Medication 25 MILLIGRAM(S): at 15:23

## 2024-04-05 RX ADMIN — Medication 1 MILLIGRAM(S): at 13:22

## 2024-04-05 RX ADMIN — Medication 2 MILLIGRAM(S): at 01:25

## 2024-04-05 RX ADMIN — Medication 40 MILLIEQUIVALENT(S): at 09:53

## 2024-04-05 RX ADMIN — Medication 1 MILLIGRAM(S): at 09:53

## 2024-04-05 NOTE — ED PROVIDER NOTE - PATIENT PORTAL LINK FT
You can access the FollowMyHealth Patient Portal offered by  by registering at the following website: [email or web address]. By joining [facility]’s FollowMyHealth portal, you will also be able to view your health information using other applications (apps) compatible with our system.

## 2024-04-05 NOTE — ED ADULT NURSE NOTE - NSFALLRISKINTERV_ED_ALL_ED
Assistance OOB with selected safe patient handling equipment if applicable/Assistance with ambulation/Communicate fall risk and risk factors to all staff, patient, and family/Monitor gait and stability/Monitor for mental status changes and reorient to person, place, and time, as needed/Provide visual cue: yellow wristband, yellow gown, etc/Reinforce activity limits and safety measures with patient and family/Toileting schedule using arm’s reach rule for commode and bathroom/Use of alarms - bed, stretcher, chair and/or video monitoring/Call bell, personal items and telephone in reach/Instruct patient to call for assistance before getting out of bed/chair/stretcher/Non-slip footwear applied when patient is off stretcher/Bennett to call system/Physically safe environment - no spills, clutter or unnecessary equipment/Purposeful Proactive Rounding/Room/bathroom lighting operational, light cord in reach

## 2024-04-05 NOTE — ED BEHAVIORAL HEALTH ASSESSMENT NOTE - CURRENT MEDICATION
PRN meds given in main ED on arrival  Ketamine 100 mg IV push  Lorazepam 1 mg po once  Lorazepam 2 mg IV push  Lorazepam 1 mg IV push  Potassium chloride 40 mEq po once none

## 2024-04-05 NOTE — ED PROVIDER NOTE - NSFOLLOWUPINSTRUCTIONS_ED_ALL_ED_FT
follow-up with detox as an outpatient   follow-up with outpatient psychiatric services as instructed   return sooner for any problems    Alcohol Abuse    Alcohol intoxication occurs when the amount of alcohol that a person has consumed impairs his or her ability to mentally and physically function. Chronic alcohol consumption can also lead to a variety of health issues including neurological disease, stomach disease, heart disease, liver disease, etc. Do not drive after drinking alcohol. Drinking enough alcohol to end up in an Emergency Room suggests you may have an alcohol abuse problem. Seek help at a drug addiction center.    SEEK IMMEDIATE MEDICAL CARE IF YOU HAVE ANY OF THE FOLLOWING SYMPTOMS: seizures, vomiting blood, blood in your stool, lightheadedness/dizziness, or becoming shaky to tremulous when you stop drinking.

## 2024-04-05 NOTE — ED BEHAVIORAL HEALTH ASSESSMENT NOTE - VIOLENCE PROTECTIVE FACTORS:
Insight into violence risk and need for management/treatment Residential stability/Relationship stability/Insight into violence risk and need for management/treatment

## 2024-04-05 NOTE — ED BEHAVIORAL HEALTH ASSESSMENT NOTE - ATTENDING COMMENTS
33F with hx alcohol misuse but no other psychiatric hx presenting after verbalizing suicidal ideation while intoxicated, an incident she does not recall. Patient is at elevated chronic risk of harm to self given alcohol use disorder however does not wish to be hospitalized psychiatrically and no evidence that this risk would be reduced via psychiatric hospitalization given the degree to which the alcohol use confers the risk. Significant protective factors including her social support, treatment engagement, connection to children, future plans. Agree with plan to recommend treat and release with outpatient followup at [facility], which is already in progress.

## 2024-04-05 NOTE — ED PROVIDER NOTE - OBJECTIVE STATEMENT
33 year old female with hx gastric bypass, vincenzo, BIBA from home for SI. Per police at bedside, they received call from family that patient was trying to jump out of a second story window. Patient admits to ETOH use tonight and repeatedly stating "please shoot me". Patient endorses feeling depressed and drunk. No other complaints. Denies co-ingestions.

## 2024-04-05 NOTE — ED BEHAVIORAL HEALTH ASSESSMENT NOTE - SUMMARY
Marci is a 33 year old female with hx of gastric bypass, GERD and no history of psychiatric diagnoses. Patient reports that she has a drinking problem and states "I know I am an alcoholic." Patient reports episodes of heavy drinking after stressful events including separation from ex , losing her job,     Patient reports history of drinking alcohol since she was 19; however, about 3 years ago she began drinking heavily after the separation from her ex  (father of her children). She reports a history of drinking wine and/or vodka daily. She reports being laid off from her job as a  in October 2023 and has since not been able to find a new job, which she states has triggered her to drink more heavily. She reports she has been dealing with depression all her life and has had outpatient treatment for her depression in January 2024.  Most recently she reports being in detox/rehab at [facility] for a month (April-March 1st 2024), which included being put on Lexapro during her stay. Since being discharged from rehab patient reports she was doing well, attending AA meetings, and not drinking as much. Patient reports issues with insurance not covering Lexapro after detox stay, so she stopped taking it which led to her starting to drink again. Prior to drinking last night, patient reports she was sober for a little over 1 week. She denies having any withdrawal sx during her 1 week of sobriety. In the past she has experienced withdrawal sx including tremors, body aches, n/v, and visual hallucinations. Last night she reports feeling anxious and angry about bills piling up, not having found a new job and that she is a failure which led her to drink wine. She reports when she drinks heavily she will drink 1-2 large bottles of wine. Patient reports last night she began drinking wine after making dinner for her children, she reports then blacking out and does not remember anything else that happened. Marci is a 33 year old female with hx of gastric bypass, GERD and no history of psychiatric diagnoses. Patient reports that she has a drinking problem and states "I know I am an alcoholic." Patient reports episodes of heavy drinking after stressful events including separation from ex , losing her job, having bills pile up, or other outside stressors. Most recently she reports being in detox/rehab at [facility] for a month (April-March 1st 2024), which included being put on Lexapro during her stay. Since being discharged from rehab patient reports she was doing well, attending AA meetings, and not drinking as much. Patient reports issues with insurance not covering Lexapro after detox stay, so she stopped taking it which led to her starting to drink again. Prior to drinking last night, patient reports she was sober for a little over 1 week. She denies having any withdrawal sx during her 1 week of sobriety. In the past she has experienced withdrawal sx including tremors, body aches, n/v, and visual hallucinations. Last night she reports feeling anxious and angry about bills piling up, not having found a new job and that she is a failure which led her to drink wine. She reports when she drinks heavily she will drink 1-2 large bottles of wine. Patient reports last night she began drinking wine after making dinner for her children, she reports then blacking out and does not remember anything else that happened. Marci is a 33 year old female with hx of gastric bypass, GERD and no history of psychiatric diagnoses. Patient reports that she has a drinking problem and states "I know I am an alcoholic." Patient reports episodes of heavy drinking after stressful events including separation from ex , losing her job, having bills pile up, or other outside stressors. Most recently she reports being in detox/rehab at [facility] for a month (April-March 1st 2024), which included being put on Lexapro during her stay. Since being discharged from rehab patient reports she was doing well, attending AA meetings, and not drinking as much.  Patient reports last night she began drinking wine after making dinner for her children, she reports then blacking out and does not remember anything else that happened.  Pt adamantly denies feeling suicidal and would not hurt herself because of her children and family.  Pt seeking tx for depression and alcohol use disorder and has follow up appt with [facility] out pt tx and now has insurance.  Case reviewed with Dr Ramirez who met with Pt and agrees with plan.  Safety plan complete

## 2024-04-05 NOTE — CHART NOTE - NSCHARTNOTEFT_GEN_A_CORE
SW note: Per psych- pt recc is T&R- pt reportedly linked w/ [facility] o/p program for MARTÍN. Worker contacted outpatient clinic at [facility] ([place]) that if pt is available to speak now they can make appt- worker provided pt w/ phone to arrange appointment for 04/19 @ 8:30 AM- pt in agreement. No other SW needs.

## 2024-04-05 NOTE — ED PROVIDER NOTE - PHYSICAL EXAMINATION
Gen: well nourished, tearful  Head: normocephalic, atraumatic  EENT: EOMI, moist mucous membranes  Lung: no increased work of breathing, clear to auscultation bilaterally, no wheezing, rales, rhonchi, speaking in full sentences  CV: regular rate, regular rhythm  Abd: soft, non-tender, non-distended  MSK: No edema, no visible deformities, full range of motion in all 4 extremities  Neuro: Awake, alert, no focal neurologic deficits  Skin: No obvious rash, no jaundice  Psych: patient tearful, variable mood - at times expressing SI, at times making jokes/laughing

## 2024-04-05 NOTE — ED ADULT NURSE REASSESSMENT NOTE - NS ED NURSE REASSESS COMMENT FT1
Alberto post medication intervention for anxiety and received Benadryl 25mg for complaint of congestion with good effect.  Patient denies suicidal or homicidal ideations.  Patient reviewed discharge instructions and provided a copy of same.  Patient agrees to return to local ED if symptoms worsen or thoughts to harm self or others develop.  No attempts to harm self or others and safety maintained.  Awaiting transportation home.
Assumed care of pt from previous RN. Pt resting on stretcher. Dressed in yellow gown. 1:1 at bedside. Bed in lowest position. Pending urine sample.
PT now awake and alert. PT does not recall events leading to hospitalization. Pt denies SI or HI. Pt reports going through a rough time after loosing job and drinking a lot of alcohol. 1:1 remains at bedside. Ed attending at bedside.
Pt. remains asleep, resting comfortably in stretcher, pt. safety maintained with CO at bedside. Pt. pending SBIRT in AM.
changed into yellow gown, belongings secured.
Presented in  area dressed in yellow gowns.  Patient is awake and alert.  Patient affect is sad and tearful during interaction.  Patient admits she feels depressed and some passive suicidal thoughts.  Patient is upset about her relapse of ETOH two weeks ago and requesting inpatient hospitalization.  Patients CIWA on presentation to  is 4 due to anxiety.  No attempts to harm self or others.  Cooperative with security contraband assessment and securing belongings in  locker.  Ambulated to her room and provided a meal as requested.  Safety of patient maintained.
Patient ate 100% of her lunch.  Patient CIWA is 5 and complaint of anxiety.  Patient offered and accepted Ativan 1mg PO at 1322 for anxiety with results pending.  No attempts to harm self or others.  Safety of patient maintained.
Yes

## 2024-04-05 NOTE — ED BEHAVIORAL HEALTH ASSESSMENT NOTE - NSBHMSERECMEM_PSY_A_CORE
unable to remember events of last night while intoxicated/Impaired unable to remember events of last night while intoxicated/Normal

## 2024-04-05 NOTE — ED BEHAVIORAL HEALTH ASSESSMENT NOTE - DETAILS
Safety plan performed with patient and discussed Collateral information from patient's sister reports patient attempted to jump out 2nd story window. Patient has no memory of this happening and states she would never want to hurt herself. two children (9 years old), spend the weekends with father Brother and Sister hx of substance abuse with pills. Brother , overdose on fentanyl. na

## 2024-04-05 NOTE — ED ADULT TRIAGE NOTE - CHIEF COMPLAINT QUOTE
Pt BIBEMS intox. States "I want to take a gun and shoot myself in the head but I don't have the balls, I want to die".

## 2024-04-05 NOTE — ED ADULT NURSE REASSESSMENT NOTE - COMFORT CARE
meal provided/plan of care explained

## 2024-04-05 NOTE — ED ADULT NURSE NOTE - NS ED NOTE ABUSE SUSPICION NEGLECT YN
Render Note In Bullet Format When Appropriate: No
Duration Of Freeze Thaw-Cycle (Seconds): 0
Medical Necessity Information: It is in your best interest to select a reason for this procedure from the list below. All of these items fulfill various CMS LCD requirements except the new and changing color options.
Render Post-Care Instructions In Note?: yes
Post-Care Instructions: I reviewed with the patient in detail post-care instructions. Patient is to wear sunprotection, and avoid picking at any of the treated lesions. Pt may apply Vaseline to crusted or scabbing areas.
Consent: The patient's consent was obtained including but not limited to risks of crusting, scabbing, blistering, scarring, darker or lighter pigmentary change, recurrence, incomplete removal and infection.
Detail Level: Detailed
Medical Necessity Clause: This procedure was medically necessary because the lesions that were treated were:
No

## 2024-04-05 NOTE — ED BEHAVIORAL HEALTH ASSESSMENT NOTE - HPI (INCLUDE ILLNESS QUALITY, SEVERITY, DURATION, TIMING, CONTEXT, MODIFYING FACTORS, ASSOCIATED SIGNS AND SYMPTOMS)
Marci is a 33 year old female with hx of gastric bypass, GERD and no history of psychiatric diagnoses. Patient reports that she has a drinking problem and states "I know I am an alcoholic." Patient reports history of drinking alcohol since she was 19; however, about 3 years ago she began drinking heavily after the separation from her ex  (father of her children). She reports a history of drinking wine and/or vodka daily. She reports being laid off from her job as a  in October 2023 and has since not been able to find a new job, which she states has triggered her to drink more heavily. She reports she has been dealing with depression all her life and has had outpatient treatment for her depression in January 2024.  Most recently she reports being in detox/rehab at [facility] for a month (April-March 1st 2024), which included being put on Lexapro during her stay. Since being discharged from rehab patient reports she was doing well, attending AA meetings, and not drinking as much. Patient reports issues with insurance not covering Lexapro after detox stay, so she stopped taking it which led to her starting to drink again. Prior to drinking last night, patient reports she was sober for a little over 1 week. She denies having any withdrawal sx during her 1 week of sobriety. In the past she has experienced withdrawal sx including tremors, body aches, n/v, and visual hallucinations. Last night she reports feeling anxious and angry about bills piling up, not having found a new job and that she is a failure which led her to drink wine. She reports when she drinks heavily she will drink 1-2 large bottles of wine. Patient reports last night she began drinking wine after making dinner for her children, she reports then blacking out and does not remember anything else that happened. Marci is a 33 year old female with hx of gastric bypass, GERD, past psychiatric diagnoses of depression, anxiety and alcohol dependence, no prior suicide attempt, or violence, former  now unemployed, bib EMS intoxicated after sister reported claiming Pt tried to jump out window when intox.   Patient reports that she has a drinking problem and states "I know I am an alcoholic." Patient reports history of drinking alcohol since she was 19; however, about 3 years ago she began drinking heavily after the separation from her ex  (father of her children). She reports a history of drinking wine and/or vodka daily. She reports being laid off from her job as a  in October 2023 and has since not been able to find a new job, which she states has triggered her to drink more heavily. She reports she has been dealing with depression all her life and has had outpatient treatment for her depression in January 2024.     Most recently she reports being in detox/rehab at [facility] for a month (April-March 1st 2024), which included being put on Lexapro during her stay. Since being discharged from rehab patient reports she was doing well, attending AA meetings, and not drinking as much. Patient reports issues with insurance not covering Lexapro after detox stay, so she stopped taking it which led to her starting to drink again. Prior to drinking last night, patient reports she was sober for a little over 1 week. She denies having any withdrawal sx during her 1 week of sobriety. In the past she has experienced withdrawal sx including tremors, body aches, n/v, and visual hallucinations. Last night she reports feeling anxious and angry about bills piling up, not having found a new job and that she is a failure which led her to drink wine. She reports when she drinks heavily she will drink 1-2 large bottles of wine. Patient reports last night she began drinking wine after making dinner for her children, she reports then blacking out and does not remember anything else that happened.  Pt denies having any memory of events last night including sitting on window sill.  Pt denies SIIP and claims she has a lot to live for specifically her children, family and her future.  Pt states she feels discouraged by her inability to maintain abstinence however wants to stop and is willing to follow recommendations.  Both sister and boyfriend Lyn reports when Pt is not drinking she is wonderful, no safety concerns and no worried about her mood or self harm and no h/o same.  Sister reports yesterday was the first time she had to call EMS nor was she ever agitated to the degree of last night.  Family have no safety concerns if discharged however has concerns about her drinking.

## 2024-04-05 NOTE — ED BEHAVIORAL HEALTH ASSESSMENT NOTE - NSSUICRSKFACTOR_PSY_ALL_CORE
Activating Events/Stressors Current and Past Psychiatric Diagnoses/Presenting Symptoms/Treatment Related Factors/Activating Events/Stressors

## 2024-04-05 NOTE — ED BEHAVIORAL HEALTH ASSESSMENT NOTE - NSACTIVEVENT_PSY_ALL_CORE
Substance intoxication or withdrawal Triggering events leading to humiliation, shame, and/or despair (e.g., Loss of relationship, financial or health status) (real or anticipated)/Substance intoxication or withdrawal

## 2024-04-05 NOTE — ED PROVIDER NOTE - ATTENDING CONTRIBUTION TO CARE
I performed a face to face bedside interview with patient regarding history of present illness, review of symptoms and past medical history. I completed an independent physical exam.  I have discussed patient's plan of care with resident.   I agree with note as stated above including HISTORY OF PRESENT ILLNESS, HIV, PAST MEDICAL/SURGICAL/FAMILY/SOCIAL HISTORY, ALLERGIES AND HOME MEDICATIONS, REVIEW OF SYSTEMS, PHYSICAL EXAM, MEDICAL DECISION MAKING and any PROGRESS NOTES during the time I functioned as the attending physician for this patient unless otherwise noted. My brief assessment is as follows:   General intoxicated combative suicidal respiratory clear cardiac no murmur abdomen soft neuro no lateralizing deficits     patient obviously intoxicated admits alcohol abuse claiming suicidality claiming she wants to shoot me should police shoot herself will require metabolization of alcohol reevaluation after alcohol metabolizes no signs of trauma

## 2024-04-05 NOTE — ED BEHAVIORAL HEALTH ASSESSMENT NOTE - DESCRIPTION
Patient BIBA from home for SI. On arrival patient was tearful and anxious. Patient reported having anxiety attack, IV Ativan 1 mg was given and behavioral health called for evaluation.    Vital Signs Last 24 Hrs  T(C): 37 (05 Apr 2024 10:32), Max: 37 (05 Apr 2024 10:32)  T(F): 98.6 (05 Apr 2024 10:32), Max: 98.6 (05 Apr 2024 10:32)  HR: 99 (05 Apr 2024 10:32) (70 - 99)  BP: 113/73 (05 Apr 2024 10:32) (94/59 - 131/95)  BP(mean): --  RR: 18 (05 Apr 2024 10:32) (18 - 18)  SpO2: 98% (05 Apr 2024 10:32) (98% - 99%)    Parameters below as of 05 Apr 2024 10:32  Patient On (Oxygen Delivery Method): room air none Hx of substance abuse with alcohol and hx of being in detox/rehab recently at [facility]. Patient is currently unemployed, recently laid off from job as a  in Oct 2023.

## 2024-04-05 NOTE — ED BEHAVIORAL HEALTH ASSESSMENT NOTE - ADDITIONAL DETAILS ALL
Patient does not recall any attempt to end her life or harm herself. Patient does not recall any events of last night due to blackout.

## 2024-04-05 NOTE — ED PROVIDER NOTE - CLINICAL SUMMARY MEDICAL DECISION MAKING FREE TEXT BOX
33 year old female BIBA for SI, admits to ETOH use, reportedly was found trying to jump out of 2nd story window. Patient intermittently screaming in ED, intermittently calm. Plan for mild sedation for agitation, constant observation, labs, cardiac monitor, reassess.

## 2024-04-05 NOTE — ED ADULT NURSE NOTE - OBJECTIVE STATEMENT
Pt. received A+Ox4, anxious appearing. Pt. states she drank 2 bottles of wine at home after feeling anxious, last drink 8pm. Pt. endorses suicidal ideation, states she recently lost her job and is in a bad relationship. Pt. states she would be open to returning to rehab.

## 2024-04-05 NOTE — ED BEHAVIORAL HEALTH ASSESSMENT NOTE - RISK ASSESSMENT
RF blackout drinking and high risk behavior when intox, not in tx, financial  PF denies SI, no h/o of suicide attempt, help seeking

## 2024-04-05 NOTE — ED BEHAVIORAL HEALTH ASSESSMENT NOTE - NSBHATTESTAPPBILLTIME_PSY_A_CORE
I attest my time as JUSTIN is greater than 50% of the total combined time spent on qualifying patient care activities. I have reviewed and verified the documentation.

## 2024-04-05 NOTE — ED PROVIDER NOTE - PROGRESS NOTE DETAILS
Patient awake and alert at this time tearful and extremely anxious.  Patient admits to history of EtOH abuse and recently discharged from [facility] detox program.  Patient admits to being noncompliant with her psychiatric medications and has been drinking this past week.  Patient states she is having anxiety attack and hyperventilating.  Will give IV Ativan 1 mg, case discussed with behavioral health RN and will send patient back to  for evaluation by psychiatry Patient seen and cleared by psychiatry and will follow-up as an outpatient

## 2024-04-07 ENCOUNTER — EMERGENCY (EMERGENCY)
Facility: HOSPITAL | Age: 34
LOS: 1 days | Discharge: HOME | End: 2024-04-07
Attending: EMERGENCY MEDICINE
Payer: COMMERCIAL

## 2024-04-07 VITALS
DIASTOLIC BLOOD PRESSURE: 80 MMHG | WEIGHT: 169.98 LBS | SYSTOLIC BLOOD PRESSURE: 134 MMHG | TEMPERATURE: 99 F | RESPIRATION RATE: 18 BRPM | HEART RATE: 88 BPM | OXYGEN SATURATION: 99 %

## 2024-04-07 DIAGNOSIS — Z98.891 HISTORY OF UTERINE SCAR FROM PREVIOUS SURGERY: Chronic | ICD-10-CM

## 2024-04-07 DIAGNOSIS — Z98.84 BARIATRIC SURGERY STATUS: Chronic | ICD-10-CM

## 2024-04-07 DIAGNOSIS — Z98.890 OTHER SPECIFIED POSTPROCEDURAL STATES: Chronic | ICD-10-CM

## 2024-04-07 PROCEDURE — 99284 EMERGENCY DEPT VISIT MOD MDM: CPT | Mod: 25
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
## 2024-04-08 PROCEDURE — 99283 EMERGENCY DEPT VISIT LOW MDM: CPT

## 2024-04-08 PROCEDURE — 73562 X-RAY EXAM OF KNEE 3: CPT

## 2024-04-08 PROCEDURE — 73562 X-RAY EXAM OF KNEE 3: CPT | Mod: 26,RT

## 2024-04-08 RX ORDER — IBUPROFEN 200 MG
600 TABLET ORAL ONCE
Refills: 0 | Status: COMPLETED | OUTPATIENT
Start: 2024-04-08 | End: 2024-04-08

## 2024-04-08 RX ORDER — ACETAMINOPHEN 500 MG
975 TABLET ORAL ONCE
Refills: 0 | Status: COMPLETED | OUTPATIENT
Start: 2024-04-08 | End: 2024-04-08

## 2024-04-08 RX ADMIN — Medication 975 MILLIGRAM(S): at 00:36

## 2024-04-08 RX ADMIN — Medication 600 MILLIGRAM(S): at 00:36

## 2024-04-08 RX ADMIN — Medication 975 MILLIGRAM(S): at 01:31

## 2024-04-08 RX ADMIN — Medication 600 MILLIGRAM(S): at 01:32

## 2024-04-08 NOTE — ED PROVIDER NOTE - PATIENT PORTAL LINK FT
Neurology Consult    Reason for consult: Seizure    HPI: Patient is a 54 year old female presenting from group home with multiple seizures. Patient has PMH of cerebral palsy (nonverbal and bedbound at baseline), seizures, intellectual disability. As per ED, patient had 5 seizures described as facial clenching and generalized convulsions which lasts about 30 seconds. Patient would vomit and appear lethargic afterwards. Unclear if patient would return to baseline between each episode, last episode was at 3:30pm. Patient missed medications today due to vomiting and lethargy.    REVIEW OF SYSTEMS:  Unable to assess    MEDICATIONS  Depakote 500 TID  Keppra 1000 TID    PMH: Intellectual disability  Constipation  Seizure disorder  Cerebral palsy    PSH: No significant past surgical history    FAMILY HISTORY:  No pertinent family history in first degree relatives    SOCIAL HISTORY:  No history of tobacco or alcohol use     Allergies  Topamax (Unknown)    Vital Signs Last 24 Hrs  HR: 100 (22 Apr 2019 17:05) (100 - 100)  BP: 108/85 (22 Apr 2019 17:05) (108/85 - 108/85)  RR: 20 (22 Apr 2019 17:05) (20 - 20)  SpO2: 97% (22 Apr 2019 17:05) (97% - 97%)    Neurological Examination:    Mental Status: Patient is alert, awake, nonverbal, not following commands. Not cooperative with examination.    Cranial Nerves: PERRL, forcibly closing eyes, no gross facial asymmetry    Motor Exam: Moving upper extremities spontaneously and strong; LE contracted    GENERAL: No acute distress  HEENT:  Normocephalic, atraumatic    LABS:  CBC Full  -  ( 22 Apr 2019 18:30 )  WBC Count : 5.09 K/uL  RBC Count : 4.26 M/uL  Hemoglobin : 13.1 g/dL  Hematocrit : 42.3 %  Platelet Count - Automated : 220 K/uL  Mean Cell Volume : 99.3 fL  Mean Cell Hemoglobin : 30.8 pg  Mean Cell Hemoglobin Concentration : 31.0 %  Auto Neutrophil # : 3.07 K/uL  Auto Lymphocyte # : 0.97 K/uL  Auto Monocyte # : 0.97 K/uL  Auto Eosinophil # : 0.04 K/uL  Auto Basophil # : 0.02 K/uL  Auto Neutrophil % : 60.2 %  Auto Lymphocyte % : 19.1 %  Auto Monocyte % : 19.1 %  Auto Eosinophil % : 0.8 %  Auto Basophil % : 0.4 %    04-22    141  |  101  |  19  ----------------------------<  100<H>  4.0   |  26  |  0.57    Ca    9.2      22 Apr 2019 18:30    TPro  6.9  /  Alb  3.2<L>  /  TBili  0.3  /  DBili  x   /  AST  36<H>  /  ALT  28  /  AlkPhos  63  04-22    LIVER FUNCTIONS - ( 22 Apr 2019 18:30 )  Alb: 3.2 g/dL / Pro: 6.9 g/dL / ALK PHOS: 63 u/L / ALT: 28 u/L / AST: 36 u/L / GGT: x You can access the FollowMyHealth Patient Portal offered by [facility] by registering at the following website: [email or web address]. By joining [employer]’s FollowMyHealth portal, you will also be able to view your health information using other applications (apps) compatible with our system.
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
## 2024-04-08 NOTE — ED PROVIDER NOTE - OBJECTIVE STATEMENT
Patient presents to ED for right knee pain constant achy worse with movement while running positive EtOH use no SI no HI no headache no chest pain shortness of breath no abdominal pain no motor or sensory deficits no swelling no blood thinners normal range of motion right lower extremity all digits no overt visible deformity.

## 2024-04-08 NOTE — ED PROVIDER NOTE - CLINICAL SUMMARY MEDICAL DECISION MAKING FREE TEXT BOX
x-ray negative ambulating RICE therapy advised acetaminophen ibuprofen for pain return to ED if worse
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
## 2024-04-08 NOTE — ED PROVIDER NOTE - MUSCULOSKELETAL, MLM
+   Tenderness palpation right knee no effusion normal range of motion normal DP PT pulses normal range of motion right lower extremity all digits

## 2024-04-08 NOTE — ED ADULT NURSE NOTE - NSFALLUNIVINTERV_ED_ALL_ED
Bed/Stretcher in lowest position, wheels locked, appropriate side rails in place/Call bell, personal items and telephone in reach/Instruct patient to call for assistance before getting out of bed/chair/stretcher/Non-slip footwear applied when patient is off stretcher/[place] to call system/Physically safe environment - no spills, clutter or unnecessary equipment/Purposeful proactive rounding/Room/bathroom lighting operational, light cord in reach

## 2024-04-24 NOTE — PATIENT PROFILE ADULT - ARRIVAL FROM
[Home

## 2024-07-01 ENCOUNTER — EMERGENCY (EMERGENCY)
Facility: HOSPITAL | Age: 34
LOS: 1 days | Discharge: HOME | End: 2024-07-01
Attending: STUDENT IN AN ORGANIZED HEALTH CARE EDUCATION/TRAINING PROGRAM
Payer: COMMERCIAL

## 2024-07-01 VITALS
OXYGEN SATURATION: 97 % | TEMPERATURE: 98 F | HEART RATE: 117 BPM | RESPIRATION RATE: 18 BRPM | SYSTOLIC BLOOD PRESSURE: 133 MMHG | DIASTOLIC BLOOD PRESSURE: 97 MMHG

## 2024-07-01 DIAGNOSIS — Z98.891 HISTORY OF UTERINE SCAR FROM PREVIOUS SURGERY: Chronic | ICD-10-CM

## 2024-07-01 DIAGNOSIS — Z98.84 BARIATRIC SURGERY STATUS: Chronic | ICD-10-CM

## 2024-07-01 DIAGNOSIS — Z98.890 OTHER SPECIFIED POSTPROCEDURAL STATES: Chronic | ICD-10-CM

## 2024-07-01 PROCEDURE — 99285 EMERGENCY DEPT VISIT HI MDM: CPT

## 2024-07-01 PROCEDURE — 99284 EMERGENCY DEPT VISIT MOD MDM: CPT | Mod: 25

## 2024-07-02 VITALS
HEART RATE: 69 BPM | TEMPERATURE: 98 F | OXYGEN SATURATION: 98 % | DIASTOLIC BLOOD PRESSURE: 57 MMHG | RESPIRATION RATE: 18 BRPM | SYSTOLIC BLOOD PRESSURE: 97 MMHG

## 2024-07-05 DIAGNOSIS — R45.851 SUICIDAL IDEATIONS: ICD-10-CM
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
## 2024-07-05 DIAGNOSIS — S00.83XA CONTUSION OF OTHER PART OF HEAD, INITIAL ENCOUNTER: ICD-10-CM

## 2024-07-05 DIAGNOSIS — S20.02XA CONTUSION OF LEFT BREAST, INITIAL ENCOUNTER: ICD-10-CM

## 2024-08-04 NOTE — ED ADULT NURSE NOTE - FINAL NURSING ELECTRONIC SIGNATURE
Liver US with doppler ordered with evidence of cirrhotic liver morphology with portal HTN, splenomegaly and moderate volume ascites. Last seen in hepatology clinic by Dr. Gutierres in 4/2023 with plans for TJ liver biopsy with pressure measurements, but seems patient was lost to follow up. LFTs and ALP wnl. Tbili today slightly elevated at 1.2 with direct bili 0.8.     Ascitic fluid studies with initial SAAG was 1.0 with total ascitic protein of 3.2. Repeat paracentesis with removal of 10L on 7/30 had SAAG of 1.5 with total ascitic protein of 2.9. Consistent with exudative fluid, as protein >2.5 g. If overload in setting of heart failure, would expect ascitic fluid to be transudative.    , troponin <0.006. EKG shows RBBB. Has been receiving IV furosemide 80 mg BID. UOP documented at 800 mL, though accuracy of documentation questionable as pt reported frequent urination and significant improvement in peripheral edema with diuresis. Pt did state to feel as if she was in afib all day on 8/1. She is currently compliant on metoprolol succinate 100 mg and nifedipine 60 mg for rate control at home. Also s/p watchman placement in 2023. 7/28 TTE with EF 60-65%, moderate RV enlargement, LA severe dilation, CVP 3 mmHg, moderate pulmonary hypertension with pulmonary artery systolic pressure 54 mmHg, and mild AS. S4 heart sound on exam, likely in setting of pulmonary HTN. Abdominal swelling also notably increased, though pt without c/o worsening CP or SOB. Lungs CTAB. Given low CVP, not convinced volume overload is d/t heart failure. Right heart catheterization on Monday, 8/5, for better diagnostic evaluation of the heart.   17-Mar-2024 13:26

## 2025-01-17 ENCOUNTER — OUTPATIENT (OUTPATIENT)
Dept: OUTPATIENT SERVICES | Facility: HOSPITAL | Age: 35
LOS: 1 days | Discharge: HOME | End: 2025-01-17
Payer: MEDICAID

## 2025-01-17 VITALS
HEIGHT: 64 IN | WEIGHT: 179.9 LBS | OXYGEN SATURATION: 100 % | DIASTOLIC BLOOD PRESSURE: 70 MMHG | SYSTOLIC BLOOD PRESSURE: 107 MMHG | HEART RATE: 70 BPM | RESPIRATION RATE: 14 BRPM | TEMPERATURE: 98 F

## 2025-01-17 DIAGNOSIS — Z98.891 HISTORY OF UTERINE SCAR FROM PREVIOUS SURGERY: Chronic | ICD-10-CM

## 2025-01-17 DIAGNOSIS — Z98.890 OTHER SPECIFIED POSTPROCEDURAL STATES: Chronic | ICD-10-CM

## 2025-01-17 DIAGNOSIS — Z98.84 BARIATRIC SURGERY STATUS: Chronic | ICD-10-CM

## 2025-01-17 DIAGNOSIS — E66.01 MORBID (SEVERE) OBESITY DUE TO EXCESS CALORIES: ICD-10-CM

## 2025-01-17 DIAGNOSIS — Z87.898 PERSONAL HISTORY OF OTHER SPECIFIED CONDITIONS: Chronic | ICD-10-CM

## 2025-01-17 DIAGNOSIS — K21.9 GASTRO-ESOPHAGEAL REFLUX DISEASE WITHOUT ESOPHAGITIS: ICD-10-CM

## 2025-01-17 LAB — HCG UR QL: NEGATIVE — SIGNIFICANT CHANGE UP

## 2025-01-17 PROCEDURE — 81025 URINE PREGNANCY TEST: CPT

## 2025-01-17 PROCEDURE — 88305 TISSUE EXAM BY PATHOLOGIST: CPT

## 2025-01-17 PROCEDURE — 88312 SPECIAL STAINS GROUP 1: CPT

## 2025-01-17 PROCEDURE — 88305 TISSUE EXAM BY PATHOLOGIST: CPT | Mod: 26

## 2025-01-17 PROCEDURE — 88312 SPECIAL STAINS GROUP 1: CPT | Mod: 26

## 2025-01-17 RX ORDER — BUPRENORPHINE HYDROCHLORIDE AND NALOXONE HYDROCHLORIDE DIHYDRATE 8; 2 MG/1; MG/1
2 TABLET SUBLINGUAL
Refills: 0 | DISCHARGE

## 2025-01-17 RX ORDER — FERROUS SULFATE 325(65) MG
0 TABLET ORAL
Refills: 0 | DISCHARGE

## 2025-01-17 RX ORDER — TRAZODONE HYDROCHLORIDE 150 MG/1
0 TABLET ORAL
Refills: 0 | DISCHARGE

## 2025-01-17 RX ORDER — TOPIRAMATE 50 MG/1
1 TABLET, COATED ORAL
Refills: 0 | DISCHARGE

## 2025-01-17 RX ORDER — VITAMIN A 10000 UNIT
0 TABLET ORAL
Refills: 0 | DISCHARGE

## 2025-01-17 RX ORDER — B COMPLEX, C NO.20/FOLIC ACID 1 MG
0 CAPSULE ORAL
Refills: 0 | DISCHARGE

## 2025-01-17 RX ORDER — SUCRALFATE 1 G/10ML
0 SUSPENSION ORAL
Refills: 0 | DISCHARGE

## 2025-01-17 RX ORDER — MAG HYDROX/ALUMINUM HYD/SIMETH 400-400-40
10 SUSPENSION, ORAL (FINAL DOSE FORM) ORAL
Refills: 0 | DISCHARGE

## 2025-01-17 RX ORDER — ESCITALOPRAM OXALATE 10 MG/1
0 TABLET ORAL
Refills: 0 | DISCHARGE

## 2025-01-17 RX ORDER — GABAPENTIN 300 MG/1
0 CAPSULE ORAL
Refills: 0 | DISCHARGE

## 2025-01-17 NOTE — ASU PATIENT PROFILE, ADULT - NS PRO AD PATIENT TYPE
[Dear  ___] : Dear  [unfilled], [Consult Letter:] : I had the pleasure of evaluating your patient, [unfilled]. [Please see my note below.] : Please see my note below. [Consult Closing:] : Thank you very much for allowing me to participate in the care of this patient.  If you have any questions, please do not hesitate to contact me. Health Care Proxy (HCP) [Sincerely,] : Sincerely, [FreeTextEntry3] : Dr. Ciera Da Silva

## 2025-01-17 NOTE — ASU PATIENT PROFILE, ADULT - NSICDXPASTSURGICALHX_GEN_ALL_CORE_FT
PAST SURGICAL HISTORY:  H/O abdominal pain     H/O bariatric surgery Gastric Sleeve  2013    H/O  section TWINS  2015    History of esophagogastroduodenoscopy (EGD) 2021    S/P cholecystectomy     S/P gastric bypass

## 2025-01-22 LAB — SURGICAL PATHOLOGY STUDY: SIGNIFICANT CHANGE UP

## 2025-01-23 DIAGNOSIS — K22.89 OTHER SPECIFIED DISEASE OF ESOPHAGUS: ICD-10-CM

## 2025-01-23 DIAGNOSIS — R10.13 EPIGASTRIC PAIN: ICD-10-CM

## 2025-01-23 DIAGNOSIS — R13.10 DYSPHAGIA, UNSPECIFIED: ICD-10-CM

## 2025-01-23 DIAGNOSIS — E66.01 MORBID (SEVERE) OBESITY DUE TO EXCESS CALORIES: ICD-10-CM

## 2025-01-23 DIAGNOSIS — Z98.0 INTESTINAL BYPASS AND ANASTOMOSIS STATUS: ICD-10-CM

## 2025-01-23 DIAGNOSIS — Z98.84 BARIATRIC SURGERY STATUS: ICD-10-CM

## 2025-01-23 DIAGNOSIS — K21.9 GASTRO-ESOPHAGEAL REFLUX DISEASE WITHOUT ESOPHAGITIS: ICD-10-CM

## 2025-03-10 ENCOUNTER — EMERGENCY (EMERGENCY)
Facility: HOSPITAL | Age: 35
LOS: 1 days | Discharge: LEFT BEFORE DISCHARGE | End: 2025-03-10
Payer: MEDICAID

## 2025-03-10 VITALS
RESPIRATION RATE: 18 BRPM | OXYGEN SATURATION: 95 % | DIASTOLIC BLOOD PRESSURE: 71 MMHG | WEIGHT: 187.39 LBS | HEART RATE: 119 BPM | TEMPERATURE: 98 F | HEIGHT: 64 IN | SYSTOLIC BLOOD PRESSURE: 109 MMHG

## 2025-03-10 DIAGNOSIS — Z98.891 HISTORY OF UTERINE SCAR FROM PREVIOUS SURGERY: Chronic | ICD-10-CM

## 2025-03-10 DIAGNOSIS — Z98.890 OTHER SPECIFIED POSTPROCEDURAL STATES: Chronic | ICD-10-CM

## 2025-03-10 DIAGNOSIS — Z98.84 BARIATRIC SURGERY STATUS: Chronic | ICD-10-CM

## 2025-03-10 DIAGNOSIS — Z87.898 PERSONAL HISTORY OF OTHER SPECIFIED CONDITIONS: Chronic | ICD-10-CM

## 2025-03-10 PROCEDURE — L9991: CPT

## 2025-03-10 NOTE — ED ADULT TRIAGE NOTE - EXPLANATION OF PATIENT'S REASON FOR LEAVING
wanted to go to [place]  detox. AOx4, not hallucinating. ED Charge YE KENNEDY made aware left @ [id number]. wanted to go to [facility]. AOx4, not hallucinating. ED Charge YE KENNEDY made aware

## 2025-03-10 NOTE — ED ADULT TRIAGE NOTE - CHIEF COMPLAINT QUOTE
c/o alcohol withdrawal. states her last drink was two days ago. c/o pain to B/L legs and "electric shocks through my heart". pt crying in triage requesting detox. hx DT and withdrawal seizures. denies SI/HI/ hallucinations.

## 2025-04-07 ENCOUNTER — EMERGENCY (EMERGENCY)
Facility: HOSPITAL | Age: 35
LOS: 1 days | End: 2025-04-07
Attending: EMERGENCY MEDICINE
Payer: COMMERCIAL

## 2025-04-07 VITALS — HEIGHT: 64 IN

## 2025-04-07 DIAGNOSIS — Z98.84 BARIATRIC SURGERY STATUS: Chronic | ICD-10-CM

## 2025-04-07 DIAGNOSIS — Z87.898 PERSONAL HISTORY OF OTHER SPECIFIED CONDITIONS: Chronic | ICD-10-CM

## 2025-04-07 DIAGNOSIS — Z98.890 OTHER SPECIFIED POSTPROCEDURAL STATES: Chronic | ICD-10-CM

## 2025-04-07 DIAGNOSIS — Z98.891 HISTORY OF UTERINE SCAR FROM PREVIOUS SURGERY: Chronic | ICD-10-CM

## 2025-04-07 LAB
ALBUMIN SERPL ELPH-MCNC: 4 G/DL — SIGNIFICANT CHANGE UP (ref 3.3–5.2)
ALP SERPL-CCNC: 133 U/L — HIGH (ref 40–120)
ALT FLD-CCNC: 16 U/L — SIGNIFICANT CHANGE UP
AMPHET UR-MCNC: NEGATIVE — SIGNIFICANT CHANGE UP
ANION GAP SERPL CALC-SCNC: 18 MMOL/L — HIGH (ref 5–17)
ANISOCYTOSIS BLD QL: SLIGHT — SIGNIFICANT CHANGE UP
APAP SERPL-MCNC: <3 UG/ML — LOW (ref 10–26)
APPEARANCE UR: CLEAR — SIGNIFICANT CHANGE UP
AST SERPL-CCNC: 39 U/L — HIGH
BARBITURATES UR SCN-MCNC: NEGATIVE — SIGNIFICANT CHANGE UP
BASOPHILS # BLD AUTO: 0.04 K/UL — SIGNIFICANT CHANGE UP (ref 0–0.2)
BASOPHILS # BLD MANUAL: 0 K/UL — SIGNIFICANT CHANGE UP (ref 0–0.2)
BASOPHILS NFR BLD AUTO: 1.2 % — SIGNIFICANT CHANGE UP (ref 0–2)
BASOPHILS NFR BLD MANUAL: 0 % — SIGNIFICANT CHANGE UP (ref 0–2)
BENZODIAZ UR-MCNC: POSITIVE
BILIRUB SERPL-MCNC: 0.3 MG/DL — LOW (ref 0.4–2)
BILIRUB UR-MCNC: NEGATIVE — SIGNIFICANT CHANGE UP
BUN SERPL-MCNC: 7.3 MG/DL — LOW (ref 8–20)
CALCIUM SERPL-MCNC: 8.4 MG/DL — SIGNIFICANT CHANGE UP (ref 8.4–10.5)
CHLORIDE SERPL-SCNC: 103 MMOL/L — SIGNIFICANT CHANGE UP (ref 96–108)
CO2 SERPL-SCNC: 20 MMOL/L — LOW (ref 22–29)
COCAINE METAB.OTHER UR-MCNC: NEGATIVE — SIGNIFICANT CHANGE UP
COLOR SPEC: YELLOW — SIGNIFICANT CHANGE UP
CREAT SERPL-MCNC: 0.47 MG/DL — LOW (ref 0.5–1.3)
DIFF PNL FLD: NEGATIVE — SIGNIFICANT CHANGE UP
EGFR: 130 ML/MIN/1.73M2 — SIGNIFICANT CHANGE UP
EGFR: 130 ML/MIN/1.73M2 — SIGNIFICANT CHANGE UP
EOSINOPHIL # BLD AUTO: 0.05 K/UL — SIGNIFICANT CHANGE UP (ref 0–0.5)
EOSINOPHIL # BLD MANUAL: 0.06 K/UL — SIGNIFICANT CHANGE UP (ref 0–0.5)
EOSINOPHIL NFR BLD AUTO: 1.5 % — SIGNIFICANT CHANGE UP (ref 0–6)
EOSINOPHIL NFR BLD MANUAL: 1.7 % — SIGNIFICANT CHANGE UP (ref 0–6)
ETHANOL SERPL-MCNC: 283 MG/DL — HIGH (ref 0–9)
FENTANYL UR QL SCN: NEGATIVE — SIGNIFICANT CHANGE UP
GIANT PLATELETS BLD QL SMEAR: PRESENT
GLUCOSE SERPL-MCNC: 91 MG/DL — SIGNIFICANT CHANGE UP (ref 70–99)
GLUCOSE UR QL: NEGATIVE MG/DL — SIGNIFICANT CHANGE UP
HCG SERPL-ACNC: <4 MIU/ML — SIGNIFICANT CHANGE UP
HCT VFR BLD CALC: 34.5 % — SIGNIFICANT CHANGE UP (ref 34.5–45)
HGB BLD-MCNC: 11.2 G/DL — LOW (ref 11.5–15.5)
IMM GRANULOCYTES # BLD AUTO: 0.01 K/UL — SIGNIFICANT CHANGE UP (ref 0–0.07)
IMM GRANULOCYTES NFR BLD AUTO: 0.3 % — SIGNIFICANT CHANGE UP (ref 0–0.9)
KETONES UR-MCNC: NEGATIVE MG/DL — SIGNIFICANT CHANGE UP
LEUKOCYTE ESTERASE UR-ACNC: NEGATIVE — SIGNIFICANT CHANGE UP
LYMPHOCYTES # BLD AUTO: 1.4 K/UL — SIGNIFICANT CHANGE UP (ref 1–3.3)
LYMPHOCYTES # BLD MANUAL: 0.83 K/UL — LOW (ref 1–3.3)
LYMPHOCYTES NFR BLD AUTO: 40.8 % — SIGNIFICANT CHANGE UP (ref 13–44)
LYMPHOCYTES NFR BLD MANUAL: 24.3 % — SIGNIFICANT CHANGE UP (ref 13–44)
MANUAL REACTIVE LYMPHOCYTES #: 0.33 K/UL — SIGNIFICANT CHANGE UP (ref 0–0.63)
MCHC RBC-ENTMCNC: 25.2 PG — LOW (ref 27–34)
MCHC RBC-ENTMCNC: 32.5 G/DL — SIGNIFICANT CHANGE UP (ref 32–36)
MCV RBC AUTO: 77.5 FL — LOW (ref 80–100)
METHADONE UR-MCNC: NEGATIVE — SIGNIFICANT CHANGE UP
MICROCYTES BLD QL: SLIGHT — SIGNIFICANT CHANGE UP
MONOCYTES # BLD AUTO: 0.22 K/UL — SIGNIFICANT CHANGE UP (ref 0–0.9)
MONOCYTES # BLD MANUAL: 0.12 K/UL — SIGNIFICANT CHANGE UP (ref 0–0.9)
MONOCYTES NFR BLD AUTO: 6.4 % — SIGNIFICANT CHANGE UP (ref 2–14)
MONOCYTES NFR BLD MANUAL: 3.5 % — SIGNIFICANT CHANGE UP (ref 2–14)
NEUTROPHILS # BLD AUTO: 1.71 K/UL — LOW (ref 1.8–7.4)
NEUTROPHILS # BLD MANUAL: 2.09 K/UL — SIGNIFICANT CHANGE UP (ref 1.8–7.4)
NEUTROPHILS NFR BLD AUTO: 49.8 % — SIGNIFICANT CHANGE UP (ref 43–77)
NEUTROPHILS NFR BLD MANUAL: 60.9 % — SIGNIFICANT CHANGE UP (ref 43–77)
NITRITE UR-MCNC: NEGATIVE — SIGNIFICANT CHANGE UP
NRBC # BLD AUTO: 0 K/UL — SIGNIFICANT CHANGE UP (ref 0–0)
NRBC # FLD: 0 K/UL — SIGNIFICANT CHANGE UP (ref 0–0)
NRBC BLD AUTO-RTO: 0 /100 WBCS — SIGNIFICANT CHANGE UP (ref 0–0)
OPIATES UR-MCNC: NEGATIVE — SIGNIFICANT CHANGE UP
OVALOCYTES BLD QL SMEAR: SLIGHT — SIGNIFICANT CHANGE UP
PCP SPEC-MCNC: SIGNIFICANT CHANGE UP
PCP UR-MCNC: NEGATIVE — SIGNIFICANT CHANGE UP
PH UR: 7 — SIGNIFICANT CHANGE UP (ref 5–8)
PLAT MORPH BLD: NORMAL — SIGNIFICANT CHANGE UP
PLATELET # BLD AUTO: 292 K/UL — SIGNIFICANT CHANGE UP (ref 150–400)
PMV BLD: 10.8 FL — SIGNIFICANT CHANGE UP (ref 7–13)
POIKILOCYTOSIS BLD QL AUTO: SLIGHT — SIGNIFICANT CHANGE UP
POLYCHROMASIA BLD QL SMEAR: SLIGHT — SIGNIFICANT CHANGE UP
POTASSIUM SERPL-MCNC: 4.1 MMOL/L — SIGNIFICANT CHANGE UP (ref 3.5–5.3)
POTASSIUM SERPL-SCNC: 4.1 MMOL/L — SIGNIFICANT CHANGE UP (ref 3.5–5.3)
PROT SERPL-MCNC: 7 G/DL — SIGNIFICANT CHANGE UP (ref 6.6–8.7)
PROT UR-MCNC: NEGATIVE MG/DL — SIGNIFICANT CHANGE UP
RBC # BLD: 4.45 M/UL — SIGNIFICANT CHANGE UP (ref 3.8–5.2)
RBC # FLD: 15.7 % — HIGH (ref 10.3–14.5)
RBC BLD AUTO: ABNORMAL
SALICYLATES SERPL-MCNC: <0.6 MG/DL — LOW (ref 10–20)
SODIUM SERPL-SCNC: 141 MMOL/L — SIGNIFICANT CHANGE UP (ref 135–145)
SP GR SPEC: 1.01 — SIGNIFICANT CHANGE UP (ref 1–1.03)
THC UR QL: POSITIVE
UROBILINOGEN FLD QL: 1 MG/DL — SIGNIFICANT CHANGE UP (ref 0.2–1)
VARIANT LYMPHS # BLD: 9.6 % — HIGH (ref 0–6)
VARIANT LYMPHS NFR BLD MANUAL: 9.6 % — HIGH (ref 0–6)
WBC # BLD: 3.43 K/UL — LOW (ref 3.8–10.5)
WBC # FLD AUTO: 3.43 K/UL — LOW (ref 3.8–10.5)

## 2025-04-07 PROCEDURE — 99285 EMERGENCY DEPT VISIT HI MDM: CPT

## 2025-04-07 PROCEDURE — 93010 ELECTROCARDIOGRAM REPORT: CPT

## 2025-04-07 PROCEDURE — 36415 COLL VENOUS BLD VENIPUNCTURE: CPT

## 2025-04-07 PROCEDURE — 96372 THER/PROPH/DIAG INJ SC/IM: CPT

## 2025-04-07 PROCEDURE — 93005 ELECTROCARDIOGRAM TRACING: CPT

## 2025-04-07 PROCEDURE — 80307 DRUG TEST PRSMV CHEM ANLYZR: CPT

## 2025-04-07 PROCEDURE — 81003 URINALYSIS AUTO W/O SCOPE: CPT

## 2025-04-07 PROCEDURE — 99284 EMERGENCY DEPT VISIT MOD MDM: CPT | Mod: 25

## 2025-04-07 PROCEDURE — 80053 COMPREHEN METABOLIC PANEL: CPT

## 2025-04-07 PROCEDURE — 85025 COMPLETE CBC W/AUTO DIFF WBC: CPT

## 2025-04-07 PROCEDURE — 84702 CHORIONIC GONADOTROPIN TEST: CPT

## 2025-04-07 RX ORDER — DIAZEPAM 2 MG/1
10 TABLET ORAL ONCE
Refills: 0 | Status: DISCONTINUED | OUTPATIENT
Start: 2025-04-07 | End: 2025-04-07

## 2025-04-07 RX ORDER — MIDAZOLAM IN 0.9 % SOD.CHLORID 1 MG/ML
5 PLASTIC BAG, INJECTION (ML) INTRAVENOUS ONCE
Refills: 0 | Status: DISCONTINUED | OUTPATIENT
Start: 2025-04-07 | End: 2025-04-07

## 2025-04-07 RX ADMIN — Medication 5 MILLIGRAM(S): at 15:04

## 2025-04-07 RX ADMIN — DIAZEPAM 10 MILLIGRAM(S): 2 TABLET ORAL at 17:56

## 2025-04-07 NOTE — ED PROVIDER NOTE - CLINICAL SUMMARY MEDICAL DECISION MAKING FREE TEXT BOX
pt is here with sister and requesting discharge. Sister will transport and assume care for the pt. Sister states that she will transport the pt to a location where she can detox. I offered and recommended that we attempt to transfer pt to [facility] from our facility, but both the pt and sister are declining, and requesting to be discharged and go home. Pt does not currently display signs of severe withdrawal and is medically stable

## 2025-04-07 NOTE — ED PROVIDER NOTE - OBJECTIVE STATEMENT
34-year-old female past medical history of alcohol abuse presents with alcohol abuse.  Patient reports that she is an everyday drinker, drinking between 4 and 5 bottles of wine per day.  She reports she was drinking this morning.  She reports that  her less than was only a few hours ago and she already feels anxious and "like I am jumping out of my skin".  Patient is requesting help with getting sober and going to detox.  She denies any hallucinations, SI, physical complaints at this time.

## 2025-04-07 NOTE — ED ADULT TRIAGE NOTE - CHIEF COMPLAINT QUOTE
pt BIBEMS from home endorsing + ETOH and cocaine use last night into this morning, pt only stating that "I already told you my name dumb bitch suck my jimbo", pt brought into [facility], pt refused vitals, yellow gown in progress, per EMS pt would like to see SW for rehab.

## 2025-04-07 NOTE — SBIRT NOTE ADULT - NSSBIRTDRGSTOPUSE_GEN_A_CORE
Middle Ear Infection (Adult)  You have an infection of the middle ear, the space behind the eardrum. This is also called acute otitis media (AOM). Sometimes it is caused by the common cold. This is because congestion can block the internal passage (eustachian tube) that drains fluid from the middle ear. When the middle ear fills with fluid, bacteria can grow there and cause an infection. Oral antibiotics are used to treat this illness, not ear drops. Symptoms usually start to improve within 1 to 2 days of treatment.    Home care  The following are general care guidelines:  · Finish all of the antibiotic medicine given, even though you may feel better after the first few days.  · You may use over-the-counter medicine, such as acetaminophen or ibuprofen, to control pain and fever, unless something else was prescribed. If you have chronic liver or kidney disease or have ever had a stomach ulcer or gastrointestinal bleeding, talk with your healthcare provider before using these medicines. Do not give aspirin to anyone under 18 years of age who has a fever. It may cause severe illness or death.  Follow-up care  Follow up with your healthcare provider, or as advised, in 2 weeks if all symptoms have not gotten better, or if hearing doesn't go back to normal within 1 month.  When to seek medical advice  Call your healthcare provider right away if any of these occur:  · Ear pain gets worse or does not improve after 3 days of treatment  · Unusual drowsiness or confusion  · Neck pain, stiff neck, or headache  · Fluid or blood draining from the ear canal  · Fever of 100.4°F (38°C) or as advised   · Seizure  Date Last Reviewed: 6/1/2016  © 7185-0483 [employer]. [address] [place] [address], [place] [address]. All rights reserved. This information is not intended as a substitute for professional medical care. Always follow your healthcare professional's instructions.        Lymphadenopathy  Lymphadenopathy is swelling  of the lymph nodes. Lymph nodes are small, bean-shaped glands around the body.  What are lymph nodes?  Lymph nodes are part of your immune system. The glands are found in your neck, armpits, groin, chest, and abdomen. They act as filters for lymph fluid as it flows through your body. Lymph fluid contains white blood cells and other things that fight infection.  Why lymph nodes swell  Lymphadenopathy is very common. The glands often enlarge during a viral or bacterial infection. It can happen during a cold, the flu, or strep throat. The nodes may swell in just one area of the body, such as the neck (localized). Or nodes may swell all over the body (generalized). The neck (cervical) lymph nodes are the most common site of lymphadenopathy.  What causes lymphadenopathy?  Dead cells and fluid build up in the lymph nodes as they help fight infection or disease. This causes them to swell in size. Enlarged lymph nodes are often near the source of infection. This can help to find the cause of an infection. For example, swollen lymph nodes around the jaw may be because of an infection in the teeth or mouth. But lymphadenopathy may also be generalized. This is common in some viral illnesses such as mononucleosis or chickenpox (varicella).  Lymphadenopathy can also be caused by:  · Infection of a lymph node or small group of nodes (lymphadenitis)  · Cancer  · Reactions to medicines such as antibiotics and some seizure medicines  · Other health conditions, such as lupus  Symptoms of lymphadenopathy  Lymphadenopathy can cause symptoms such as:  · Lumps under the jaw, on the sides or back of the neck, in the armpits, in the groin, or in the chest or belly (abdomen)  · Pain or tenderness in any of these areas  · Redness or warmth in any of these areas  You may also have symptoms from an infection causing the swollen glands. These symptoms may include fever, sore throat, body aches, or cough.  Diagnosing lymphadenopathy  Your health  care provider will ask about your health history and symptoms. He or she will give you a physical exam and check the areas where lymph nodes are enlarged. Your health care provider will check the size and location of the nodes, and ask how long they have been swollen and if they are painful. Diagnostic tests and referral to specialists may be recommended. They may include:  · Blood tests. These are done to check for signs of infection and other problems.  · Urine test. This is also done to check for infection and other problems.  · Chest X-ray. This test can show enlarged lymph nodes or other problems.  · Lymph node biopsy. If lymph nodes are swollen for 3 to 4 weeks, they may be checked with a biopsy. Small samples of lymph node tissue are taken and checked in a lab for signs of cancer. You may be referred to a specialist in blood disorders and cancer (hematologist and oncologist).  Treatment for lymphadenopathy  The treatment of enlarged lymph nodes depends on the cause. Enlarged lymph nodes are often harmless and go away without any treatment. Treatment is most often done on the cause of the enlarged nodes and may include:  · Antibiotic medicine to treat a bacterial infection  · Incision and drainage (I & D) of a lymph node for lymphadenitis  · Other medicines or procedures to treat the cause of the enlarged nodes  You may need follow-up exam in 3 to 4 weeks to recheck enlarged nodes.     When to call your health care provider  Call your health care provider if you have lymph nodes that are still swollen after 3 to 4 weeks.   Date Last Reviewed: 6/19/2015  © 8782-1431 [employer]. [address] [place] [address], [place] [address]. All rights reserved. This information is not intended as a substitute for professional medical care. Always follow your healthcare professional's instructions.      Please follow up with your Primary care provider within 2-5 days if your signs and symptoms have not resolved or  worsen.     If your condition worsens or fails to improve we recommend that you receive another evaluation at the emergency room immediately or contact your primary medical clinic to discuss your concerns.   You must understand that you have received an Urgent Care treatment only and that you may be released before all of your medical problems are known or treated. You, the patient, will arrange for follow up care as instructed.     RED FLAGS/WARNING SYMPTOMS DISCUSSED WITH PATIENT THAT WOULD WARRANT EMERGENT MEDICAL ATTENTION. PATIENT VERBALIZED UNDERSTANDING.     Elevated Blood Pressure  Your blood pressure was elevated during your visit to the urgent care.  It was not so high that immediate care was needed but it is recommended that you monitor your blood pressure over the next week or two to make sure that it is not staying elevated.  Please have your blood pressure taken 2-3 times daily at different times of the day.  Write all of those blood pressures down and record the time that they were taken.  Keep all that information and take it with you to see your Primary Care Physician.  If your blood pressure is consistently above 140/90 you will need to follow up with your PCP more quickly     No

## 2025-04-07 NOTE — ED PROVIDER NOTE - CPE EDP SKIN NORM
Patient Instructions by Tarah Haq CPT at 04/27/17 04:33 PM     Author:  Tarah Haq CPT Service:  (none) Author Type:  Certified Nursing Assistant     Filed:  04/27/17 04:35 PM Encounter Date:  4/27/2017 Status:  Signed     :  Tarah Haq CPT (Certified Nursing Assistant)            PATIENT INFORMATION           Follow-Up      -- Make an appointment with Jak Perez MD in two weeks.  Continue medihoney and rolled gauze as directed by Dr. Perez.          Additional Educational Resources:  For additional resources regarding your symptoms, diagnosis, or further health information, please visit the Health Resources section on [email or web address] or the Online Health Resources section in [employer].          Revision History        User Key Date/Time User Provider Type Action    > [N/A] 04/27/17 04:35 PM Tarah Haq CPT Certified Nursing Assistant Sign            
normal...

## 2025-04-07 NOTE — ED PROVIDER NOTE - PATIENT PORTAL LINK FT
You can access the FollowMyHealth Patient Portal offered by [facility] by registering at the following website: [email or web address]. By joining [employer]’s FollowMyHealth portal, you will also be able to view your health information using other applications (apps) compatible with our system.

## 2025-04-08 NOTE — ED ADULT NURSE NOTE - OBJECTIVE STATEMENT
Patient presents to ER for medical evaluation, reports drinking and consuming cocaine last night, requests Detox, patient is anxious and mildly, denies SI/HI, resp even/unlabored, no S/S injuries,

## 2025-04-08 NOTE — ED ADULT NURSE NOTE - NSFALLRISKINTERV_ED_ALL_ED
Communicate fall risk and risk factors to all staff, patient, and family/Provide visual cue: yellow wristband, yellow gown, etc/Reinforce activity limits and safety measures with patient and family/Use of alarms - bed, stretcher, chair and/or video monitoring/Call bell, personal items and telephone in reach/Instruct patient to call for assistance before getting out of bed/chair/stretcher/Non-slip footwear applied when patient is off stretcher/[place] to call system/Physically safe environment - no spills, clutter or unnecessary equipment/Purposeful Proactive Rounding/Room/bathroom lighting operational, light cord in reach

## 2025-04-14 ENCOUNTER — EMERGENCY (EMERGENCY)
Facility: HOSPITAL | Age: 35
LOS: 1 days | End: 2025-04-14
Attending: STUDENT IN AN ORGANIZED HEALTH CARE EDUCATION/TRAINING PROGRAM
Payer: COMMERCIAL

## 2025-04-14 VITALS — HEIGHT: 64 IN

## 2025-04-14 DIAGNOSIS — Z98.890 OTHER SPECIFIED POSTPROCEDURAL STATES: Chronic | ICD-10-CM

## 2025-04-14 DIAGNOSIS — F10.90 ALCOHOL USE, UNSPECIFIED, UNCOMPLICATED: ICD-10-CM

## 2025-04-14 DIAGNOSIS — Z87.898 PERSONAL HISTORY OF OTHER SPECIFIED CONDITIONS: Chronic | ICD-10-CM

## 2025-04-14 DIAGNOSIS — Z98.84 BARIATRIC SURGERY STATUS: Chronic | ICD-10-CM

## 2025-04-14 DIAGNOSIS — Z98.891 HISTORY OF UTERINE SCAR FROM PREVIOUS SURGERY: Chronic | ICD-10-CM

## 2025-04-14 LAB
ALBUMIN SERPL ELPH-MCNC: 4.8 G/DL — SIGNIFICANT CHANGE UP (ref 3.3–5.2)
ALP SERPL-CCNC: 173 U/L — HIGH (ref 40–120)
ALT FLD-CCNC: 22 U/L — SIGNIFICANT CHANGE UP
AMPHET UR-MCNC: NEGATIVE — SIGNIFICANT CHANGE UP
ANION GAP SERPL CALC-SCNC: 22 MMOL/L — HIGH (ref 5–17)
APAP SERPL-MCNC: <3 UG/ML — LOW (ref 10–26)
APPEARANCE UR: CLEAR — SIGNIFICANT CHANGE UP
AST SERPL-CCNC: 32 U/L — HIGH
BARBITURATES UR SCN-MCNC: NEGATIVE — SIGNIFICANT CHANGE UP
BASOPHILS # BLD AUTO: 0.06 K/UL — SIGNIFICANT CHANGE UP (ref 0–0.2)
BASOPHILS NFR BLD AUTO: 0.8 % — SIGNIFICANT CHANGE UP (ref 0–2)
BENZODIAZ UR-MCNC: POSITIVE
BILIRUB SERPL-MCNC: 0.5 MG/DL — SIGNIFICANT CHANGE UP (ref 0.4–2)
BILIRUB UR-MCNC: NEGATIVE — SIGNIFICANT CHANGE UP
BUN SERPL-MCNC: 11 MG/DL — SIGNIFICANT CHANGE UP (ref 8–20)
CALCIUM SERPL-MCNC: 8.9 MG/DL — SIGNIFICANT CHANGE UP (ref 8.4–10.5)
CHLORIDE SERPL-SCNC: 102 MMOL/L — SIGNIFICANT CHANGE UP (ref 96–108)
CO2 SERPL-SCNC: 19 MMOL/L — LOW (ref 22–29)
COCAINE METAB.OTHER UR-MCNC: NEGATIVE — SIGNIFICANT CHANGE UP
COLOR SPEC: YELLOW — SIGNIFICANT CHANGE UP
CREAT SERPL-MCNC: 0.61 MG/DL — SIGNIFICANT CHANGE UP (ref 0.5–1.3)
DIFF PNL FLD: NEGATIVE — SIGNIFICANT CHANGE UP
EGFR: 122 ML/MIN/1.73M2 — SIGNIFICANT CHANGE UP
EGFR: 122 ML/MIN/1.73M2 — SIGNIFICANT CHANGE UP
EOSINOPHIL # BLD AUTO: 0.03 K/UL — SIGNIFICANT CHANGE UP (ref 0–0.5)
EOSINOPHIL NFR BLD AUTO: 0.4 % — SIGNIFICANT CHANGE UP (ref 0–6)
ETHANOL SERPL-MCNC: 329 MG/DL — HIGH (ref 0–9)
FENTANYL UR QL SCN: NEGATIVE — SIGNIFICANT CHANGE UP
GLUCOSE SERPL-MCNC: 104 MG/DL — HIGH (ref 70–99)
GLUCOSE UR QL: NEGATIVE MG/DL — SIGNIFICANT CHANGE UP
HCG SERPL-ACNC: <4 MIU/ML — SIGNIFICANT CHANGE UP
HCT VFR BLD CALC: 39.3 % — SIGNIFICANT CHANGE UP (ref 34.5–45)
HGB BLD-MCNC: 12.4 G/DL — SIGNIFICANT CHANGE UP (ref 11.5–15.5)
IMM GRANULOCYTES # BLD AUTO: 0.03 K/UL — SIGNIFICANT CHANGE UP (ref 0–0.07)
IMM GRANULOCYTES NFR BLD AUTO: 0.4 % — SIGNIFICANT CHANGE UP (ref 0–0.9)
KETONES UR-MCNC: NEGATIVE MG/DL — SIGNIFICANT CHANGE UP
LEUKOCYTE ESTERASE UR-ACNC: NEGATIVE — SIGNIFICANT CHANGE UP
LYMPHOCYTES # BLD AUTO: 1.18 K/UL — SIGNIFICANT CHANGE UP (ref 1–3.3)
LYMPHOCYTES NFR BLD AUTO: 15.9 % — SIGNIFICANT CHANGE UP (ref 13–44)
MCHC RBC-ENTMCNC: 24.9 PG — LOW (ref 27–34)
MCHC RBC-ENTMCNC: 31.6 G/DL — LOW (ref 32–36)
MCV RBC AUTO: 79.1 FL — LOW (ref 80–100)
METHADONE UR-MCNC: NEGATIVE — SIGNIFICANT CHANGE UP
MONOCYTES # BLD AUTO: 0.35 K/UL — SIGNIFICANT CHANGE UP (ref 0–0.9)
MONOCYTES NFR BLD AUTO: 4.7 % — SIGNIFICANT CHANGE UP (ref 2–14)
NEUTROPHILS # BLD AUTO: 5.76 K/UL — SIGNIFICANT CHANGE UP (ref 1.8–7.4)
NEUTROPHILS NFR BLD AUTO: 77.8 % — HIGH (ref 43–77)
NITRITE UR-MCNC: NEGATIVE — SIGNIFICANT CHANGE UP
NRBC # BLD AUTO: 0 K/UL — SIGNIFICANT CHANGE UP (ref 0–0)
NRBC # FLD: 0 K/UL — SIGNIFICANT CHANGE UP (ref 0–0)
NRBC BLD AUTO-RTO: 0 /100 WBCS — SIGNIFICANT CHANGE UP (ref 0–0)
OPIATES UR-MCNC: NEGATIVE — SIGNIFICANT CHANGE UP
PCP SPEC-MCNC: SIGNIFICANT CHANGE UP
PCP UR-MCNC: NEGATIVE — SIGNIFICANT CHANGE UP
PH UR: 6.5 — SIGNIFICANT CHANGE UP (ref 5–8)
PLATELET # BLD AUTO: 268 K/UL — SIGNIFICANT CHANGE UP (ref 150–400)
PMV BLD: 10.2 FL — SIGNIFICANT CHANGE UP (ref 7–13)
POTASSIUM SERPL-MCNC: 3.8 MMOL/L — SIGNIFICANT CHANGE UP (ref 3.5–5.3)
POTASSIUM SERPL-SCNC: 3.8 MMOL/L — SIGNIFICANT CHANGE UP (ref 3.5–5.3)
PROT SERPL-MCNC: 8.4 G/DL — SIGNIFICANT CHANGE UP (ref 6.6–8.7)
PROT UR-MCNC: SIGNIFICANT CHANGE UP MG/DL
RBC # BLD: 4.97 M/UL — SIGNIFICANT CHANGE UP (ref 3.8–5.2)
RBC # FLD: 17.2 % — HIGH (ref 10.3–14.5)
SALICYLATES SERPL-MCNC: <0.6 MG/DL — LOW (ref 10–20)
SODIUM SERPL-SCNC: 143 MMOL/L — SIGNIFICANT CHANGE UP (ref 135–145)
SP GR SPEC: 1.01 — SIGNIFICANT CHANGE UP (ref 1–1.03)
THC UR QL: POSITIVE
UROBILINOGEN FLD QL: 1 MG/DL — SIGNIFICANT CHANGE UP (ref 0.2–1)
WBC # BLD: 7.41 K/UL — SIGNIFICANT CHANGE UP (ref 3.8–10.5)
WBC # FLD AUTO: 7.41 K/UL — SIGNIFICANT CHANGE UP (ref 3.8–10.5)

## 2025-04-14 PROCEDURE — 90792 PSYCH DIAG EVAL W/MED SRVCS: CPT | Mod: 95

## 2025-04-14 PROCEDURE — 93010 ELECTROCARDIOGRAM REPORT: CPT

## 2025-04-14 PROCEDURE — 99285 EMERGENCY DEPT VISIT HI MDM: CPT

## 2025-04-14 RX ORDER — KETAMINE HCL IN 0.9 % NACL 50 MG/5 ML
200 SYRINGE (ML) INTRAVENOUS ONCE
Refills: 0 | Status: DISCONTINUED | OUTPATIENT
Start: 2025-04-14 | End: 2025-04-14

## 2025-04-14 RX ORDER — MIDAZOLAM IN 0.9 % SOD.CHLORID 1 MG/ML
4 PLASTIC BAG, INJECTION (ML) INTRAVENOUS ONCE
Refills: 0 | Status: DISCONTINUED | OUTPATIENT
Start: 2025-04-14 | End: 2025-04-14

## 2025-04-14 RX ORDER — HALOPERIDOL 10 MG/1
5 TABLET ORAL ONCE
Refills: 0 | Status: COMPLETED | OUTPATIENT
Start: 2025-04-14 | End: 2025-04-14

## 2025-04-14 RX ORDER — LORAZEPAM 4 MG/ML
2 VIAL (ML) INJECTION ONCE
Refills: 0 | Status: DISCONTINUED | OUTPATIENT
Start: 2025-04-14 | End: 2025-04-14

## 2025-04-14 RX ORDER — KETAMINE HCL IN 0.9 % NACL 50 MG/5 ML
100 SYRINGE (ML) INTRAVENOUS ONCE
Refills: 0 | Status: DISCONTINUED | OUTPATIENT
Start: 2025-04-14 | End: 2025-04-14

## 2025-04-14 RX ORDER — MIDAZOLAM IN 0.9 % SOD.CHLORID 1 MG/ML
10 PLASTIC BAG, INJECTION (ML) INTRAVENOUS ONCE
Refills: 0 | Status: DISCONTINUED | OUTPATIENT
Start: 2025-04-14 | End: 2025-04-14

## 2025-04-14 RX ADMIN — Medication 200 MILLIGRAM(S): at 14:57

## 2025-04-14 RX ADMIN — Medication 10 MILLIGRAM(S): at 14:55

## 2025-04-14 RX ADMIN — Medication 100 MILLIGRAM(S): at 17:06

## 2025-04-14 RX ADMIN — Medication 2 MILLIGRAM(S): at 20:35

## 2025-04-14 RX ADMIN — Medication 4 MILLIGRAM(S): at 17:04

## 2025-04-14 RX ADMIN — HALOPERIDOL 5 MILLIGRAM(S): 10 TABLET ORAL at 20:35

## 2025-04-14 NOTE — ED BEHAVIORAL HEALTH ASSESSMENT NOTE - SUMMARY
34F lives w grandmother, works as a , psych hx of mood disorder, reports [facility], sees psychiatrist Dr Park and is rxed lexapro 20mg, seroquel 100mg, denies hx self harm/sa/ psych admission, substance use hx of daily drinking, reports ranges from 1 or 2 drinks per day to 1 bottle of wine, reports smokes weed daily, denies medical problems, consult called to evaluate agitation. Pt reports she drank a bottle of wine tonight, and regrets doing this, does not recall her actions prior to presentation, and denies current SI/HI. However, given pt's BAL was 329 at 15:15, she will not be able to be fully assessed until 1:15am. She is NOT psychiatrically cleared to leave the ED.

## 2025-04-14 NOTE — ED BEHAVIORAL HEALTH ASSESSMENT NOTE - RISK ASSESSMENT
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
## 2025-04-14 NOTE — ED BEHAVIORAL HEALTH ASSESSMENT NOTE - DETAILS
not identified Brother and Sister hx of substance abuse with pills. Brother , overdose on fentanyl. ana attending hpi
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
## 2025-04-14 NOTE — ED PROVIDER NOTE - PROGRESS NOTE DETAILS
Patient received in signout pending  evaluation.  Due to sedation patient to be evaluated by  in the morning.  Stable at this time

## 2025-04-14 NOTE — ED PROVIDER NOTE - ATTENDING CONTRIBUTION TO CARE
35 y/o female intoxicated agitated   , wants to kill self   requires sedation    no evidence of trauma   observe    pending sobriety

## 2025-04-14 NOTE — ED PROVIDER NOTE - CLINICAL SUMMARY MEDICAL DECISION MAKING FREE TEXT BOX
dr zeng dr zeng  35 y/o female intoxicated agitated   , wants to kill self   requires sedation    no evidence of trauma   observe    pending sobriety

## 2025-04-14 NOTE — ED PROVIDER NOTE - PHYSICAL EXAMINATION
Const: Agitation. In no acute distress.   HEENT: NC/AT. Moist mucous membranes.  Eyes: No scleral icterus. EOMI.  Neck:. Soft and supple. Full ROM without pain.  Cardiac: Regular rate and rhythm. +S1/S2. No murmurs. Peripheral pulses 2+ and symmetric. No LE edema.  Resp: Speaking in full sentences. No evidence of respiratory distress. No wheezes, rales or rhonchi.  Abd: Soft, non-tender, non-distended. Normal bowel sounds in all 4 quadrants. No guarding or rebound.  Back: Spine midline and non-tender. No CVAT.  Neuro: no gross deficits, moving all extremities, normal speech  Skin: No rashes, abrasions or lacerations.

## 2025-04-14 NOTE — ED ADULT TRIAGE NOTE - CHIEF COMPLAINT QUOTE
per ems pt was missing person found in her backyard arrives screaming.  called to bedside, security called.

## 2025-04-14 NOTE — ED ADULT NURSE REASSESSMENT NOTE - NS ED NURSE REASSESS COMMENT FT1
Pt aggressive and fighting staff. MD Farias at bedside. Pt medicated per orders-see mar. Remains connected to cardiac monitor-NSR on room air SpO2 98%. Airway patent RR even unlabored. 1:1 remains in place. Safety maintained.

## 2025-04-14 NOTE — ED ADULT NURSE NOTE - NSFALLUNIVINTERV_ED_ALL_ED
Bed/Stretcher in lowest position, wheels locked, appropriate side rails in place/Call bell, personal items and telephone in reach/Instruct patient to call for assistance before getting out of bed/chair/stretcher/Non-slip footwear applied when patient is off stretcher/[place] to call system/Physically safe environment - no spills, clutter or unnecessary equipment/Purposeful proactive rounding/Room/bathroom lighting operational, light cord in reach

## 2025-04-14 NOTE — ED ADULT NURSE NOTE - OBJECTIVE STATEMENT
Pt presents to ED aggressive and fighting staff. Brought to cc. MD Zepeda at bedside. Pt given versed and ketamine IM-see mar. Pt reports SI. 1:1 in place and put in yellow gown with belongings secured. Connected to CM-ST  on room air SpO2 98%. Safety maintained.

## 2025-04-14 NOTE — ED ADULT TRIAGE NOTE - HEIGHT IN CM
Pt updated.      Recommendations from pain management per earlier discussion thread:    \"Dr. Watkins said that if he was to complete the paperwork for you, he would have you return to work without restrictions other than with a 25lb lifting limitation for 4 weeks, then back to full regular duty.\"    Will reroute to PCP, is this what you needed Dr. Barnard, or are you looking for something more formal.  Please let me know how I can help. Thanks.      [phone]

## 2025-04-15 VITALS
SYSTOLIC BLOOD PRESSURE: 123 MMHG | RESPIRATION RATE: 20 BRPM | OXYGEN SATURATION: 98 % | DIASTOLIC BLOOD PRESSURE: 78 MMHG | HEART RATE: 103 BPM | TEMPERATURE: 98 F

## 2025-04-15 VITALS
HEART RATE: 80 BPM | TEMPERATURE: 99 F | OXYGEN SATURATION: 98 % | RESPIRATION RATE: 18 BRPM | SYSTOLIC BLOOD PRESSURE: 125 MMHG | DIASTOLIC BLOOD PRESSURE: 72 MMHG

## 2025-04-15 PROCEDURE — 99235 HOSP IP/OBS SAME DATE MOD 70: CPT

## 2025-04-15 PROCEDURE — 99285 EMERGENCY DEPT VISIT HI MDM: CPT | Mod: 25

## 2025-04-15 PROCEDURE — 80053 COMPREHEN METABOLIC PANEL: CPT

## 2025-04-15 PROCEDURE — 99214 OFFICE O/P EST MOD 30 MIN: CPT

## 2025-04-15 PROCEDURE — 81003 URINALYSIS AUTO W/O SCOPE: CPT

## 2025-04-15 PROCEDURE — 80307 DRUG TEST PRSMV CHEM ANLYZR: CPT

## 2025-04-15 PROCEDURE — 84702 CHORIONIC GONADOTROPIN TEST: CPT

## 2025-04-15 PROCEDURE — 93005 ELECTROCARDIOGRAM TRACING: CPT

## 2025-04-15 PROCEDURE — 96372 THER/PROPH/DIAG INJ SC/IM: CPT | Mod: XU

## 2025-04-15 PROCEDURE — 96374 THER/PROPH/DIAG INJ IV PUSH: CPT

## 2025-04-15 PROCEDURE — G0378: CPT

## 2025-04-15 PROCEDURE — 96375 TX/PRO/DX INJ NEW DRUG ADDON: CPT

## 2025-04-15 PROCEDURE — 85025 COMPLETE CBC W/AUTO DIFF WBC: CPT

## 2025-04-15 PROCEDURE — 36415 COLL VENOUS BLD VENIPUNCTURE: CPT

## 2025-04-15 RX ORDER — LORAZEPAM 4 MG/ML
1 VIAL (ML) INJECTION ONCE
Refills: 0 | Status: DISCONTINUED | OUTPATIENT
Start: 2025-04-15 | End: 2025-04-15

## 2025-04-15 RX ORDER — ALPRAZOLAM 0.5 MG
0.5 TABLET, EXTENDED RELEASE 24 HR ORAL ONCE
Refills: 0 | Status: DISCONTINUED | OUTPATIENT
Start: 2025-04-15 | End: 2025-04-22

## 2025-04-15 RX ADMIN — Medication 1 MILLIGRAM(S): at 04:34

## 2025-04-15 NOTE — ED CDU PROVIDER DISPOSITION NOTE - PATIENT PORTAL LINK FT
You can access the FollowMyHealth Patient Portal offered by [facility] by registering at the following website: [email or web address]. By joining [facility]’s FollowMyHealth portal, you will also be able to view your health information using other applications (apps) compatible with our system.

## 2025-04-15 NOTE — ED CDU PROVIDER INITIAL DAY NOTE - CLINICAL SUMMARY MEDICAL DECISION MAKING FREE TEXT BOX
Patient presented with alcohol intoxication and suicidal ideation.  Need to be sedated by ED team. pending  eval

## 2025-04-15 NOTE — ED BEHAVIORAL HEALTH PROGRESS NOTE - DETAILS
Pt denies ever feeling SI current or past and no h/o self harm or severe depression.  Will return to ED if feels unsafe na

## 2025-04-15 NOTE — ED BEHAVIORAL HEALTH PROGRESS NOTE - COLLATERAL INFORMATION (NAME, PHONE, RELATIONSHIP):
Spoke with grandmother, Maisha, [phone],  who raised Pt,  who denies safety concerns if discharged.  Hayder denies h/o self harm behaviors.

## 2025-04-15 NOTE — ED ADULT NURSE REASSESSMENT NOTE - NS ED NURSE REASSESS COMMENT FT1
Pt care assumed @ this time. Pt resting in bed. VSS. Resp even and unlabored. Awaiting further plan of care at this time. 1:1 in place. Pt care assumed @ this time. Pt resting in bed. VSS. Resp even and unlabored. Awaiting further plan of care at this time. 1:1 in place. Yellow gown on, belongings secured

## 2025-04-15 NOTE — ED BEHAVIORAL HEALTH PROGRESS NOTE - CASE SUMMARY/FORMULATION (CLEARLY DOCUMENT RATIONALE FOR DISPOSITION CHANGE)
Pt seen on follow up.  Friendly,  well related, states she relapsed on alcohol after fired from job as a  after 4 mo sober.  Plans to go to law school and claims she knows what she has to do..call her sponsor and get another job.  States she was so upset because it happened before.  States was fired because she took time off to go to program at [employer].  Pt responsible to children.  Unclear reason for consult other than agitation when intoxicated.  Unk if made a suicidal statement, not listed.  No evidence of depressed mood, luis fernando  AVH, delusions.  Denies SI/HI.  No indication for admission.  Continue Lexapro 20 mg and follow up with out pt provider Dr Park.  T&R

## 2025-04-15 NOTE — ED CDU PROVIDER DISPOSITION NOTE - CLINICAL COURSE
35 yo F pmhx of EtoH and cocaine abuse, pw agitation. Pt was sedated for agitation overnight and chel was consulted for SI statements. Pt medically cleared and was evaluated by psych overnight. Pt placed in obs prior to my shift for psych re-eval in the AM. pt Aox3, denies SI, HI, well appearing, cleared by psych in the AM after collateral was obtained by psych (spoke to pt's grandmother). Pt stable for DC.

## 2025-04-15 NOTE — ED BEHAVIORAL HEALTH PROGRESS NOTE - STANDING MEDS RECEIVED IN ED?
Recommendation Preamble: The following recommendations were made during the visit: zinc or titanium sunscreens.
Detail Level: Zone
No

## 2025-04-15 NOTE — ED BEHAVIORAL HEALTH PROGRESS NOTE - NSBHMSESPEECH_PSY_A_CORE
I am seeing the patient in consult at the request of Dr. Surjit Wall for medical clearance prior to surgery for right carpal tunnel release and right ulnar nerve decompression with possible subcutaneous right ulnar nerve transposition.    -----------------------------------------------------------------------------------------------------------    Consultation Summary:    PATIENT TENTATIVELY CLEARED FOR SURGERY -  Pending EKG and Lab results    ------------------------------------------------------------------------------------------------------------    PAST MEDICAL HISTORY:    Type II or unspecified type diabetes mellitus *                 Comment: Adult onset    Mitral valve disorders(424.0)                                   Comment: Mitral Valve Prolapse    Anxiety state, unspecified                                      Comment: Anxiety Disorder    Cardiac dysrhythmia, unspecified                                Comment: Interventricular conduction defect/ atrial                tachycardia    Essential hypertension, benign                                Other and unspecified hyperlipidemia                          Pneumonia                                                     Urinary retention with incomplete bladder empt*                 Comment: [facility]SC 4x daily/Dr Gamez/[address] [place], [place] [address]cal for                supplies    Neurogenic bladder disorder                     01/04/2014    Bronchitis                                                    Gastroesophageal reflux disease                               Urinary tract infection                                       Fracture                                                        Comment: fx pelvis 2016    Polymyalgia rheumatica (CMS/[facility])                              Cholelithiasis                                                PAST SURGICAL HISTORY:    TOTAL ABDOM HYSTERECTOMY                                        Comment: MARAH w BSO    APPENDECTOMY                                                DELIVERY+POSTPARTUM CARE                               Comment:     REMOVAL OF OVARY(S)                                             Comment: Oophorectomy    COLONOSCOPY W BIOPSY                            05         Comment: Dr. LUIS Luke ([facility]) Hyperplastic Polyp X2 (10               Year F/U)    LAPAROSCOPIC VENTRAL HERNIA RP                              Comment: ventral hernia repair    ECHO HEART STRESS                                           Comment: Normal    HOLTER MONITOR WEARABLE                         , 3/84    EXC SKIN BENIG 0.6-1CM TRUNK,ARM,LEG                       Comment: L shoulder     DIL URETHRA,FEMALE,INITIAL                                CARDIAC STRESS TEST COMPLETE                    10/25/01/*      Comment: WNL    D AND C                                                   FLEXIBLE SIGMOIDOSCOPY DIAGNOSTIC INCLUDE SPEC*             Comment: Flex Sig w/o Bx    DEXA BONE DENSITY AXIAL SKELETON                            Comment: normal    COLONOSCOPY W BIOPSY                            6/14/10         Comment: Dr. Espana, [facility] - Await bx's - Benign Tissue,                F/U 10 years    TYMPANOSTOMY                                    10-25-11        Comment: right ear tube - Dr. Mckinnon    PTCA                                                          HERNIA REPAIR                                                 SERVICE TO UROLOGY                                             SECTION, CLASSIC                                 ESOPHAGOGASTRODUODENOSCOPY TRANSORAL FLEX W/BX* 2016       Comment: NERD, Mild S+Acute Gastritis - Dr Gatica    CYSTOURETHROSCOPY                               2019      Comment: Dr. Gamez, Neurgenic bladder, ISC    LAPAROSCOPY,CHOLYST W/ CHOLGPY                  2020      Comment: Dr. Mani Navraro    Family History   Problem Relation Age of Onset   • Diabetes  Sister    • * Sister 67        Heart attack   • COPD Sister    • Hypertension Sister    • * Sister 62        heart attack   • Heart disease Sister    • Hypertension Sister    • Asthma Brother    • Diabetes Brother    • Heart disease Brother    • Hypertension Brother    • Arthritis Mother 57   • Heart disease Mother    • Miscarriages / Stillbirths Mother    • Myocardial Infarction Mother    • Cancer Father 69        lung   • Hypertension Father    • Stroke Father    • Substance abuse Father    • Diabetes Sister    • Hypertension Sister    • Cancer Sister         brain and liver   • COPD Sister    • Diabetes Sister    • Heart disease Sister 77   • Hypertension Sister    • Kidney disease Sister    • Myocardial Infarction Sister 79   • Diabetes Sister    • Hypertension Sister    • Diabetes Sister    • Hypertension Sister    • Other Sister         PBC, Auto Immune Hepatitis stage 2   • Blood Disorder Sister         cirrhosis   • Diabetes Sister    • Hypertension Sister    • Diabetes Brother    • Heart disease Brother    • Hypertension Brother    • Hypertension Brother         blind - optic nerve   • Blood Disorder Brother         hemachromatosis   • Diabetes Brother         blind -optic nerve   • Heart disease Brother    • Hypertension Brother    • Heart disease Brother    • Diabetes Brother    • Hypertension Brother    • Cancer Paternal Aunt         breast cancer   • Cancer Paternal Aunt         ? breast cancer     Review of patient's family status indicates:    Sister                                           Sister                                           Brother                                      41      Comment: M!, DM    Mother                                       57      Comment: MI, borderline DM    Father                                       70      Comment: cancer    Sister                                             Comment: diabetes    Sister                                              Comment: diabetes, heart problems     Sister                         Alive                     Sister                         Alive                     Sister                         Alive                     Brother                                      38      Comment: MI    Brother                        Alive                     Son                            Alive                     Daughter                       Alive                     Son                            Alive                     Brother                        Alive                     Brother                        Alive                     Paternal Aunt                                            Paternal Aunt                                              Social History     Socioeconomic History   • Marital status: /Civil Union     Spouse name: Not on file   • Number of children: 3   • Years of education: Not on file   • Highest education level: Not on file   Occupational History   • Occupation: [employer]     Employer: [employer]   Social Needs   • Financial resource strain: Not on file   • Food insecurity:     Worry: Not on file     Inability: Not on file   • Transportation needs:     Medical: Not on file     Non-medical: Not on file   Tobacco Use   • Smoking status: Former Smoker     Packs/day: 1.50     Years: 20.00     Pack years: 30.00     Types: Cigarettes     Last attempt to quit: 2003     Years since quittin.4   • Smokeless tobacco: Never Used   • Tobacco comment: Patient stated she smoke at least a pack a day until , then quit for a year and restarted and smoked until    Substance and Sexual Activity   • Alcohol use: Yes     Comment: rarely, 1-2 per year on a holiday   • Drug use: No   • Sexual activity: Yes     Partners: Male     Birth control/protection: Surgical     Comment: hysterectomy   Lifestyle   • Physical activity:     Days per week: Not on file      Minutes per session: Not on file   • Stress: Not on file   Relationships   • Social connections:     Talks on phone: Not on file     Gets together: Not on file     Attends Roman [employer] service: Not on file     Active member of club or organization: Not on file     Attends meetings of clubs or organizations: Not on file     Relationship status: Not on file   • Intimate partner violence:     Fear of current or ex partner: Not on file     Emotionally abused: Not on file     Physically abused: Not on file     Forced sexual activity: Not on file   Other Topics Concern   • Not on file   Social History Narrative   • Not on file       REVIEW OF SYSTEMS:  GENERAL:  Denies weight loss, weight gain, fatigue and fever or chills.  SKIN:  Denies rashes, itching and lumps or bumps or moles that are changing.  EYES:  Denies vision loss, pain, redness and blurry or double vision.  EARS:  Denies decreased hearing, ringing in ears (tinnitus), earache and drainage.  NOSE:  Denies stuffiness, discharge, nasal pain, itching and nose bleeds.  MOUTH:  Denies teeth pain, gum pain, bleeding of gums, dry mouth, sore throat and hoarseness or voice change.  NECK:  Denies lumps or bumps, swollen glands, pain, stiffness and limited range of motion in the neck.  RESPIRATORY:  Denies cough, dry or wet, productive, sputum, coughing up blood, shortness of breath, wheezing and painful breathing.  CARDIOVASCULAR:  Denies chest pain or discomfort, tightness, palpitations, shortness of breath with activity, difficulty breathing lying down, swelling, cyanosis and sudden awakening from sleep with shortness of breath.  VASCULAR:  Denies claudication with walking, blanching of fingers with cold exposure, leg cramping, discoloration of feet and change in nails.  GASTROINTESTINAL:  Denies swallowing difficulties, heartburn, change in appetite, nausea, hematemesis, change in bowel habits, rectal bleeding, constipation, diarrhea, flatulence, dysphagia, black tarry  stools, blood in stools and hemorrhoids.  GENITOURINARY/MALE:  Denies dysuria and hematuria.  NEUROLOGICAL:  Denies dizziness, fainting, seizures, confusion, weakness, numbness, tingling, tremor and trouble with balance or coordination.  MUSCULOSKELETAL:  Denies joint stiffness, joint pain, joint swelling, muscle pain, back pain and redness of joints.  HEMATOLOGIC:  Denies ease of bruising and ease of bleeding.  ENDOCRINE:  Denies heat or cold intolerance, sweating, frequent urination, thirst and change in appetite.  PSYCHIATRIC:  Denies nervousness, depression, memory loss, hopelessness and helplessness.    PHYSICAL EXAM:  Blood pressure 114/80, pulse (!) 103, height 5' 1.5\" (1.562 m), weight 88.5 kg, SpO2 95 %, not currently breastfeeding.  Vital signs evaluated.  Nursing staff notes reviewed and accepted.  HEENT:  Normocephalic, atraumatic.  PERRLA, EOMI, there is no AV (arteriovenous) nicking, fundi are benign. Tympanic membranes are clear bilaterally.  Normal nasal and oral mucosa.  HEAD:  Normocephalic.  Atraumatic.  No masses, lesions, tenderness or abnormalities noted.  EYES:  PERRLA, EOMI, fundi grossly normal, no papiledema, no AV (arteriovenous) nicking, sclerae clear.  EARS:  Tympanic membranes are clear bilaterally, normal light reflex, normal external canals.  NOSE:  Nares normal.  Septum midline.  Mucosa normal.  No erythema.  No drainage.  THROAT:  Moist mucous membranes, no tonsillar hypertrophy, no erythema, exudates or petechiae noted.  SINUS:  Maxillary and frontal sinuses are nontender to palpation.  NECK:  Supple, no JVD (jugular venous distention), no carotid bruits, no lymphadenopathy.  LUNGS:  Lungs are clear to auscultation.  No wheezes, rales or rhonchi.  Chest symmetric with normal A/P diameter, no chest deformities noted, normal respiratory rate and rhythm, no chest wall tenderness, diaphragmatic excursion normal, percussion normal, lungs clear to auscultation.  HEART:  S1, S2, regular  rate and rhythm no murmer, S3, or S4 auscultated.  CHEST:  Symmetrical, nontender, no retractions, no bony abnormality.  ABDOMEN:  Soft, nontender, no masses, no hepatosplenomegaly, no distention, bowel sounds are normoactive.  EXTREMITIES:  No erythema, no edema.  NEUROLOGICAL:  Cranial nerves 2-12 grossly intact, DTR's (deep tendon reflexes) 2+/4+ bilaterally and symmetrical, normal strength, normal vibration and monofilament testing of both feet.  SKIN:  Warm, moist, without erythema, rash, or lesions.  Feet are examined - no redness, ulcer or callus.  MENTAL STATUS:  Alert and oriented x3, normal thought, mood and affect, no hallucinations noted.    LAB:  BMP and A1c ordered.    EK2020  Normal sinus rhythm   RSR' or QR pattern in V1 suggests right ventricular conduction delay   Prolonged QT   Nonspecific T wave abnormality   Abnormal ECG         ASSESSMENT /  PLAN:    Patient is Heredia Class 1.  American Society of Anesthesia Class 3.  Risk of manjit-operative mortality is 1-3%.  Patient has no reversible risk.    OK for surgery with patient's verbal agreement - with understanding of risk as noted above - with the following to be done prior to surgery:  BMP and A1c are pending.    System Review:    Cardiovascular:  Patient is able to walk up 1 flight(s) of stairs.  Patient is able to do 4-6 METS of work.  No additional work up needed    Pulmonary:  Patient is at average risk of pulmonary complication.  Please use incentive spirometry post-operatively.  Please mobilize patient promptly in the post-operative time frame.    Infection risk:  Patient is at average risk of infectious complication  Please delay feeding of the patient until fully awake and upright to avoid risk of aspiration.    Cognitive risk:  Patient is not at increased risk of cognitive complication and has no baseline cognitive issues    Chronic medical problems as noted above.  Patient is informed to hold Aspirin for 7 days prior to surgery.   Patient informed of the potential risk of stopping Aspirin and is in verbal agreement of this plan with understanding of this risk.    Post operative medical care to be provided by:  Hospitalist    -----------------------------------------------------------------------------------------------------------    Consultation Summary:    Patient is tentatively cleared for surgery - pending Lab results.     ------------------------------------------------------------------------------------------------------------      ASSESSMENT:  1. Right wrist pain  2. 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    3. Preop cardiovascular exam  Patient without cardiac symptomatology.  EKG in February within appropriate limits of ischemic changes.  May proceed with surgery    4. Preoperative evaluation to rule out surgical contraindication  BMP and A1 c are ordered at this time.  Last serum potassium was mildly low at 3.2.  I see no evidence of any major abnormalities to hold on surgery at this time will see what the electrolytes se.  Treat according to results.    5. Type 2 diabetes mellitus with hyperglycemia, with long-term current use of insulin (CMS/[facility])  Previous   .A1c shows moderate sugar control at   Hemoglobin A1C (%)   Date Value   12/13/2019 7.8 (H)    (Want A1c less than 7.0.)  Recommend improvement with diet and exercise.      A1c is ordered.  - GLYCOHEMOGLOBIN; Future    6. Essential hypertension  Blood pressue controlled on current medication without problems or symptoms.  Continue current medication.        She will hold her hydrochlorothiazide for the procedure monitor for elevated blood pressure.    7. Mixed hyperlipidemia  Stable on atorvastatin.  Continue monitor.    8. Low blood potassium  Previous general chemistry did show mildly low potassium at 3.2.  Recheck BMP this time.    I an unable to say whether potassium will be a problem with her surgery on Monday.  She is scheduled my office for  lasting and Friday.  Labs will not be available to me until later today or tomorrow.  Will try to see what the results are before surgery.      -----------------------------------------------------------------------------------------------------------    Consultation Summary:    Patient is tentatively cleared for surgery - pending  Lab results. (BMP and A1c - low potassium of 3.2 noted in the past.)     ------------------------------------------------------------------------------------------------------------      ADDENDUM:     LAB:   Component      Latest Ref Rng & Units 6/26/2020   Fasting Status      Hours 4   Sodium      135 - 145 mmol/L 141   Potassium      3.4 - 5.1 mmol/L 3.3 (L)   Chloride      98 - 107 mmol/L 100   CO2      21 - 32 mmol/L 32   ANION GAP      10 - 20 mmol/L 12   Glucose      65 - 99 mg/dL 130 (H)   BUN      6 - 20 mg/dL 16   Creatinine      0.51 - 0.95 mg/dL 0.90   Glomerular Filtration Rate      >90 mL/min/1.73m2 68 (L)   BUN/CREATININE RATIO      7 - 25 18   CALCIUM      8.4 - 10.2 mg/dL 9.0   GLYCOHEMOGLOBIN A1C      4.5 - 5.6 % 8.0 (H)       Patient medically cleared for surgery.   Normal volume, rate, productivity, spontaneity and articulation

## 2025-04-15 NOTE — [facility] PROGRESS NOTE - NSBHPSYCHOLCOGORIENT_PSY_A_CORE
Patient Instructions by Eddie Butler MD at 01/10/18 03:07 PM     Author:  Eddie Butler MD Service:  (none) Author Type:  Physician     Filed:  01/10/18 03:11 PM Encounter Date:  1/10/2018 Status:  Signed     :  Eddie Butler MD (Physician)            PATIENT INFORMATION        Follow-Up    - If not better in 2-3 days, call or make a follow-up appointment.    Additional Educational Resources:  For additional resources regarding your symptoms, diagnosis, or further health information, please visit the Health Resources section on [email or web address] or the Online Health Resources section in [employer].        Revision History        User Key Date/Time User Provider Type Action    > [N/A] 01/10/18 03:11 PM Eddie Butler MD Physician Sign            
Oriented to time, place, person, situation

## 2025-09-04 ENCOUNTER — EMERGENCY (EMERGENCY)
Facility: HOSPITAL | Age: 35
LOS: 1 days | End: 2025-09-04
Attending: STUDENT IN AN ORGANIZED HEALTH CARE EDUCATION/TRAINING PROGRAM
Payer: COMMERCIAL

## 2025-09-04 VITALS
OXYGEN SATURATION: 100 % | RESPIRATION RATE: 18 BRPM | DIASTOLIC BLOOD PRESSURE: 83 MMHG | HEART RATE: 88 BPM | SYSTOLIC BLOOD PRESSURE: 104 MMHG | TEMPERATURE: 99 F

## 2025-09-04 VITALS
SYSTOLIC BLOOD PRESSURE: 131 MMHG | OXYGEN SATURATION: 100 % | HEART RATE: 116 BPM | RESPIRATION RATE: 19 BRPM | TEMPERATURE: 99 F | DIASTOLIC BLOOD PRESSURE: 99 MMHG

## 2025-09-04 DIAGNOSIS — Z98.84 BARIATRIC SURGERY STATUS: Chronic | ICD-10-CM

## 2025-09-04 DIAGNOSIS — Z98.890 OTHER SPECIFIED POSTPROCEDURAL STATES: Chronic | ICD-10-CM

## 2025-09-04 DIAGNOSIS — Z98.891 HISTORY OF UTERINE SCAR FROM PREVIOUS SURGERY: Chronic | ICD-10-CM

## 2025-09-04 DIAGNOSIS — Z87.898 PERSONAL HISTORY OF OTHER SPECIFIED CONDITIONS: Chronic | ICD-10-CM

## 2025-09-04 PROCEDURE — 96372 THER/PROPH/DIAG INJ SC/IM: CPT

## 2025-09-04 PROCEDURE — 99285 EMERGENCY DEPT VISIT HI MDM: CPT | Mod: 25

## 2025-09-04 PROCEDURE — 99291 CRITICAL CARE FIRST HOUR: CPT

## 2025-09-04 RX ORDER — KETAMINE HCL IN 0.9 % NACL 50 MG/5 ML
200 SYRINGE (ML) INTRAVENOUS ONCE
Refills: 0 | Status: DISCONTINUED | OUTPATIENT
Start: 2025-09-04 | End: 2025-09-04

## 2025-09-04 RX ORDER — MIDAZOLAM IN 0.9 % SOD.CHLORID 1 MG/ML
10 PLASTIC BAG, INJECTION (ML) INTRAVENOUS ONCE
Refills: 0 | Status: DISCONTINUED | OUTPATIENT
Start: 2025-09-04 | End: 2025-09-04

## 2025-09-04 RX ADMIN — Medication 10 MILLIGRAM(S): at 01:00

## 2025-09-04 RX ADMIN — Medication 200 MILLIGRAM(S): at 01:03

## (undated) DEVICE — SUT VICRYL 0 27" UR-6

## (undated) DEVICE — GLV 8 PROTEXIS (WHITE)

## (undated) DEVICE — BLADE SURGICAL #15 CARBON

## (undated) DEVICE — PACK GENERAL LAPAROSCOPY

## (undated) DEVICE — TROCAR COVIDIEN VERSAPORT BLADELESS OPTICAL 5MM STANDARD

## (undated) DEVICE — SYR CONTROL LUER LOK 10CC

## (undated) DEVICE — VENODYNE/SCD SLEEVE CALF MEDIUM

## (undated) DEVICE — SUT MONOCRYL 4-0 27" PS-2 UNDYED

## (undated) DEVICE — TROCAR COVIDIEN VERSAPORT BLADELESS OPTICAL 11MM STANDARD

## (undated) DEVICE — GLV 8 PROTEXIS (BLUE)

## (undated) DEVICE — DRSG STERISTRIPS 0.5 X 4"

## (undated) DEVICE — INSUFFLATION NDL ETHICON ENDOPATH PNEUMOPARITONEUM 120MM

## (undated) DEVICE — ELCTR CORD FOOTSWITCH 1PLR LAPSCP 10FT

## (undated) DEVICE — LIGASURE MARYLAND 37CM

## (undated) DEVICE — D HELP - CLEARVIEW CLEARIFY SYSTEM

## (undated) DEVICE — TUBING STRYKEFLOW II SUCTION / IRRIGATOR

## (undated) DEVICE — TROCAR ETHICON ENDOPATH XCEL UNIVERSAL SLEEVE WITH OPTIVIEW 5MM X 100MM

## (undated) DEVICE — ENDOCATCH 10MM SPECIMEN POUCH

## (undated) DEVICE — WARMING BLANKET UPPER ADULT

## (undated) DEVICE — Device